# Patient Record
Sex: MALE | Race: WHITE | NOT HISPANIC OR LATINO | Employment: FULL TIME | URBAN - METROPOLITAN AREA
[De-identification: names, ages, dates, MRNs, and addresses within clinical notes are randomized per-mention and may not be internally consistent; named-entity substitution may affect disease eponyms.]

---

## 2021-01-06 ENCOUNTER — OFFICE VISIT (OUTPATIENT)
Dept: FAMILY MEDICINE CLINIC | Facility: CLINIC | Age: 36
End: 2021-01-06
Payer: COMMERCIAL

## 2021-01-06 VITALS
SYSTOLIC BLOOD PRESSURE: 112 MMHG | TEMPERATURE: 97.5 F | HEART RATE: 93 BPM | DIASTOLIC BLOOD PRESSURE: 60 MMHG | OXYGEN SATURATION: 99 % | WEIGHT: 302 LBS | BODY MASS INDEX: 48.53 KG/M2 | HEIGHT: 66 IN | RESPIRATION RATE: 16 BRPM

## 2021-01-06 DIAGNOSIS — Z23 NEED FOR TDAP VACCINATION: ICD-10-CM

## 2021-01-06 DIAGNOSIS — G47.19 EXCESSIVE DAYTIME SLEEPINESS: ICD-10-CM

## 2021-01-06 DIAGNOSIS — E83.41 HYPERMAGNESEMIA: ICD-10-CM

## 2021-01-06 DIAGNOSIS — E55.9 VITAMIN D DEFICIENCY: ICD-10-CM

## 2021-01-06 DIAGNOSIS — D50.8 IRON DEFICIENCY ANEMIA SECONDARY TO INADEQUATE DIETARY IRON INTAKE: Primary | ICD-10-CM

## 2021-01-06 PROCEDURE — 99204 OFFICE O/P NEW MOD 45 MIN: CPT | Performed by: FAMILY MEDICINE

## 2021-01-06 PROCEDURE — 90471 IMMUNIZATION ADMIN: CPT | Performed by: FAMILY MEDICINE

## 2021-01-06 PROCEDURE — 1036F TOBACCO NON-USER: CPT | Performed by: FAMILY MEDICINE

## 2021-01-06 PROCEDURE — 3008F BODY MASS INDEX DOCD: CPT | Performed by: FAMILY MEDICINE

## 2021-01-06 PROCEDURE — 3725F SCREEN DEPRESSION PERFORMED: CPT | Performed by: FAMILY MEDICINE

## 2021-01-06 PROCEDURE — 90715 TDAP VACCINE 7 YRS/> IM: CPT | Performed by: FAMILY MEDICINE

## 2021-01-06 RX ORDER — ERGOCALCIFEROL 1.25 MG/1
50000 CAPSULE ORAL WEEKLY
COMMUNITY
Start: 2021-01-12 | End: 2021-03-15 | Stop reason: SDUPTHER

## 2021-01-06 RX ORDER — FERROUS SULFATE 325(65) MG
325 TABLET ORAL 2 TIMES DAILY
COMMUNITY
Start: 2021-01-05 | End: 2022-02-11

## 2021-01-06 RX ORDER — PANTOPRAZOLE SODIUM 40 MG/1
40 TABLET, DELAYED RELEASE ORAL DAILY
COMMUNITY
Start: 2021-01-05 | End: 2022-01-05 | Stop reason: ALTCHOICE

## 2021-01-06 RX ORDER — DIPHENOXYLATE HYDROCHLORIDE AND ATROPINE SULFATE 2.5; .025 MG/1; MG/1
1 TABLET ORAL DAILY
COMMUNITY
Start: 2021-01-06 | End: 2022-02-11

## 2021-01-06 NOTE — PATIENT INSTRUCTIONS
Repeat cbc, magnesium on Monday  Repeat vit D in 8 weeks (early march)  F/u with hematology  Make appt with GI  Consider sleep study

## 2021-01-06 NOTE — PROGRESS NOTES
Assessment/Plan:    No problem-specific Assessment & Plan notes found for this encounter  Diagnoses and all orders for this visit:    Iron deficiency anemia secondary to inadequate dietary iron intake  Comments:  +heme positive stools and hx of peptic ulcers, but major share of pt's anemia is likely due to poor absorption after pennie en y   continue iron infusions with hematology  Make appt with GI  Recheck cbc on Monday to ensure stability    Vitamin D deficiency  Comments:  continue weekly high-dose supplementation  recheck 8 weeks  Orders:  -     Vitamin D 25 hydroxy; Future    Hypermagnesemia  Comments:  mildly high in hospital  recheck monday with cbc  Orders:  -     Magnesium; Future    Excessive daytime sleepiness  Comments:  refer for sleep study  Orders:  -     Diagnostic Sleep Study; Future    Need for Tdap vaccination  -     TDAP VACCINE GREATER THAN OR EQUAL TO 6YO IM    Other orders  -     pantoprazole (PROTONIX) 40 mg tablet; Take 40 mg by mouth daily  -     multivitamin (THERAGRAN) TABS; Take 1 tablet by mouth daily  -     ferrous sulfate 325 (65 Fe) mg tablet; Take 325 mg by mouth 2 (two) times a day  -     ergocalciferol (VITAMIN D2) 50,000 units; Take 50,000 Units by mouth once a week  -     cyanocobalamin (VITAMIN B-12) 1000 MCG tablet; Take 1,000 mcg by mouth daily  -     CALCIUM CARBONATE-VITAMIN D PO; Take 1 tablet by mouth 2 (two) times a day        Subjective:      Patient ID: Maryjane Funez is a 38 y o  male  HPI     Pt presents as new pt  D/c'd yesterday from [facility]- for symptomatic iron-deficiency anemia  Went in with shortness of breath, palps, chest pain, LE edema   hgb antoinette of 5 1  Pt had gastric bypass 15 years ago and has never had follow-up and hasn't taken any meds since  Stool heme+  Pt has noticed no blood in stool or dark tarry stools  Pt was transfused 2 units separately and hgb came up to 6 9    Pt was given IV iron infusion and he was d/c'd with hgb 6 9 with instructions to f/u with GI for scopes and heme-onc for continued iron infusion  Pt's vit D was 5, so he was started on high-dose replacement and is also being supplemented with vit B12 and oral iron  protonix started for gastric protection  Of note, pt states he has a hx of gastric ulcers, but he states that since transfusion, he has had no abd pain, shortness of breath, palps, chest pain, and edema has resolved  He feels well today  Pt's high weight was 450 and low was about 220  He is currently 350  Doesn't exercise  He states he is pretty sure he has sleep apnea  Doesn't sleep well and states he never has  Unsure if he snores, but he wakes up about q30min  +tired all the time  The following portions of the patient's history were reviewed and updated as appropriate: allergies, current medications, past family history, past medical history, past social history, past surgical history and problem list     Review of Systems   Constitutional: Positive for fatigue  Negative for chills, fever and unexpected weight change  HENT: Negative for congestion, ear pain, hearing loss, postnasal drip, rhinorrhea, sinus pressure, sinus pain, sore throat, trouble swallowing and voice change  Eyes: Negative for pain, redness and visual disturbance  Respiratory: Negative for cough and shortness of breath  Cardiovascular: Negative for chest pain, palpitations and leg swelling  Gastrointestinal: Negative for abdominal pain, constipation, diarrhea and nausea  Endocrine: Negative for cold intolerance, heat intolerance, polydipsia and polyuria  Genitourinary: Negative for dysuria, frequency and urgency  Musculoskeletal: Negative for arthralgias, joint swelling and myalgias  Skin: Negative for rash  No suspicious lesions   Neurological: Negative for weakness, numbness and headaches  Hematological: Negative for adenopathy           Objective:      /60   Pulse 93   Temp 97 5 °F (36 4 °C) Resp 16    5' 6 46" (1 688 m)   Wt (!) 137 kg (302 lb)   SpO2 99%   BMI 48 08 kg/m²          Physical Exam  Constitutional:       General: He is not in acute distress  Appearance: He is well-developed  He is obese  Comments: pallor   HENT:      Head: Normocephalic and atraumatic  Right Ear: Tympanic membrane, ear canal and external ear normal       Left Ear: Tympanic membrane, ear canal and external ear normal       Nose: Nose normal       Mouth/Throat:      Pharynx: No oropharyngeal exudate  Comments: Small, posterior airway  Large tongue  Eyes:      Conjunctiva/sclera: Conjunctivae normal       Pupils: Pupils are equal, round, and reactive to light  Neck:      Thyroid: No thyromegaly  Vascular: No carotid bruit or JVD  Cardiovascular:      Rate and Rhythm: Regular rhythm  Heart sounds: S1 normal and S2 normal  No murmur  No friction rub  No gallop  No S3 or S4 sounds  Pulmonary:      Effort: Pulmonary effort is normal       Breath sounds: Normal breath sounds  No wheezing, rhonchi or rales  Abdominal:      General: Bowel sounds are normal  There is no distension  Palpations: Abdomen is soft  Tenderness: There is no abdominal tenderness  Lymphadenopathy:      Cervical: No cervical adenopathy  Neurological:      Mental Status: He is alert and oriented to person, place, and time  Cranial Nerves: No cranial nerve deficit  Sensory: No sensory deficit  Deep Tendon Reflexes: Reflexes are normal and symmetric

## 2021-01-11 ENCOUNTER — TELEPHONE (OUTPATIENT)
Dept: FAMILY MEDICINE CLINIC | Facility: CLINIC | Age: 36
End: 2021-01-11

## 2021-01-11 ENCOUNTER — APPOINTMENT (OUTPATIENT)
Dept: LAB | Facility: CLINIC | Age: 36
End: 2021-01-11
Payer: COMMERCIAL

## 2021-01-11 DIAGNOSIS — E83.41 HYPERMAGNESEMIA: ICD-10-CM

## 2021-01-11 DIAGNOSIS — D50.8 IRON DEFICIENCY ANEMIA SECONDARY TO INADEQUATE DIETARY IRON INTAKE: Primary | ICD-10-CM

## 2021-01-11 DIAGNOSIS — D50.8 IRON DEFICIENCY ANEMIA SECONDARY TO INADEQUATE DIETARY IRON INTAKE: ICD-10-CM

## 2021-01-11 LAB
BASOPHILS # BLD AUTO: 0.14 THOUSANDS/ΜL (ref 0–0.1)
BASOPHILS NFR BLD AUTO: 3 % (ref 0–1)
EOSINOPHIL # BLD AUTO: 0.2 THOUSAND/ΜL (ref 0–0.61)
EOSINOPHIL NFR BLD AUTO: 4 % (ref 0–6)
ERYTHROCYTE [DISTWIDTH] IN BLOOD BY AUTOMATED COUNT: 28.4 % (ref 11.6–15.1)
HCT VFR BLD AUTO: 34 % (ref 36.5–49.3)
HGB BLD-MCNC: 8.9 G/DL (ref 12–17)
IMM GRANULOCYTES # BLD AUTO: 0.05 THOUSAND/UL (ref 0–0.2)
IMM GRANULOCYTES NFR BLD AUTO: 1 % (ref 0–2)
LYMPHOCYTES # BLD AUTO: 1.58 THOUSANDS/ΜL (ref 0.6–4.47)
LYMPHOCYTES NFR BLD AUTO: 29 % (ref 14–44)
MAGNESIUM SERPL-MCNC: 2 MG/DL (ref 1.6–2.6)
MCH RBC QN AUTO: 19.3 PG (ref 26.8–34.3)
MCHC RBC AUTO-ENTMCNC: 26.2 G/DL (ref 31.4–37.4)
MCV RBC AUTO: 74 FL (ref 82–98)
MONOCYTES # BLD AUTO: 0.38 THOUSAND/ΜL (ref 0.17–1.22)
MONOCYTES NFR BLD AUTO: 7 % (ref 4–12)
NEUTROPHILS # BLD AUTO: 3.15 THOUSANDS/ΜL (ref 1.85–7.62)
NEUTS SEG NFR BLD AUTO: 56 % (ref 43–75)
NRBC BLD AUTO-RTO: 0 /100 WBCS
PLATELET # BLD AUTO: 319 THOUSANDS/UL (ref 149–390)
PMV BLD AUTO: 9 FL (ref 8.9–12.7)
RBC # BLD AUTO: 4.62 MILLION/UL (ref 3.88–5.62)
WBC # BLD AUTO: 5.5 THOUSAND/UL (ref 4.31–10.16)

## 2021-01-11 PROCEDURE — 85025 COMPLETE CBC W/AUTO DIFF WBC: CPT

## 2021-01-11 PROCEDURE — 36415 COLL VENOUS BLD VENIPUNCTURE: CPT

## 2021-01-11 PROCEDURE — 83735 ASSAY OF MAGNESIUM: CPT

## 2021-01-11 NOTE — TELEPHONE ENCOUNTER
Patient called he wants to go for his lab work this morning but Dr Reece Alcantara did not put the CBC in the order for him to have done  Would you please have someone put the order in for the CBC so he can get these labs drawn this morning, today is the only day he has to go get it drawn  Please call patient when the order is in so he can head over

## 2021-01-12 ENCOUNTER — TELEPHONE (OUTPATIENT)
Dept: FAMILY MEDICINE CLINIC | Facility: CLINIC | Age: 36
End: 2021-01-12

## 2021-01-12 NOTE — TELEPHONE ENCOUNTER
Patient returned to work yesterday, Monday, 1/11/21 and his employer would like a note stating that it is ok for him to return  Would you please be so kind as to provide him with a note stating that it is ok for the patient to return to work full duty on Monday, 1/11/21  Please enter note on portal and advise patient when ready

## 2021-01-27 ENCOUNTER — PATIENT MESSAGE (OUTPATIENT)
Dept: FAMILY MEDICINE CLINIC | Facility: CLINIC | Age: 36
End: 2021-01-27

## 2021-01-28 NOTE — TELEPHONE ENCOUNTER
From: Lacy Garcia LPN  To: Nikkokristina Singh  Sent: 1/27/2021 11:56 AM EST  Subject: Influenza Vaccine     Hello,     We hope you are doing well  While it is important to receive your flu vaccine yearly, our records show that you have not yet received yours for this year  Please call the office at [phone] if you are interested in scheduling a visit to receive the vaccine  If you have had your flu vaccine outside of our office, please reply to this message with the date and location so we can update your medical record  If you have already received your flu vaccine in our office, please disregard this message            We look forward to hearing from you,    Your Medical Team at [phone]

## 2021-03-10 DIAGNOSIS — Z23 ENCOUNTER FOR IMMUNIZATION: ICD-10-CM

## 2021-03-12 ENCOUNTER — TELEPHONE (OUTPATIENT)
Dept: FAMILY MEDICINE CLINIC | Facility: CLINIC | Age: 36
End: 2021-03-12

## 2021-03-12 DIAGNOSIS — E55.9 VITAMIN D DEFICIENCY: Primary | ICD-10-CM

## 2021-03-12 NOTE — TELEPHONE ENCOUNTER
Patient called in, he could not reply on mychart    he would like his vitamin d med to go the [facility]  He said dr Shemar Cardenas sent a message via [employer]-    Mr Hugo Dietrich,     Your vitamin D is much higher, though still not at goal   I want to do another 8 weeks of vitamin d supplementation (once a week, 50,000 IU) and recheck  Glee Sever is up now   Discontinue any multivitamins  Please let me know what pharmacy I can sent the vitamin d to       Dr Raffaele Mederos

## 2021-03-15 ENCOUNTER — IMMUNIZATIONS (OUTPATIENT)
Dept: FAMILY MEDICINE CLINIC | Facility: HOSPITAL | Age: 36
End: 2021-03-15

## 2021-03-15 DIAGNOSIS — Z23 ENCOUNTER FOR IMMUNIZATION: Primary | ICD-10-CM

## 2021-03-15 PROCEDURE — 91300 SARS-COV-2 / COVID-19 MRNA VACCINE (PFIZER-BIONTECH) 30 MCG: CPT

## 2021-03-15 PROCEDURE — 0001A SARS-COV-2 / COVID-19 MRNA VACCINE (PFIZER-BIONTECH) 30 MCG: CPT

## 2021-03-15 RX ORDER — ERGOCALCIFEROL 1.25 MG/1
50000 CAPSULE ORAL WEEKLY
Qty: 8 CAPSULE | Refills: 0 | Status: SHIPPED | OUTPATIENT
Start: 2021-03-15 | End: 2022-02-11 | Stop reason: ALTCHOICE

## 2021-03-15 NOTE — TELEPHONE ENCOUNTER
Medication: Vitamin D2 50,000  Last refilled: 1/6/21  Last Office Visit: 1/6/21  Next Office Visit: 1/7/22  Pharmacy: [facility]

## 2021-03-15 NOTE — TELEPHONE ENCOUNTER
Patient called in, he said he went to the pharmacy but they did not have any meds for him to   Please send the vitamin d to [facility] pharmacy and call patient back?

## 2021-03-23 ENCOUNTER — TELEMEDICINE (OUTPATIENT)
Dept: FAMILY MEDICINE CLINIC | Facility: CLINIC | Age: 36
End: 2021-03-23
Payer: COMMERCIAL

## 2021-03-23 VITALS — WEIGHT: 300 LBS | BODY MASS INDEX: 48.21 KG/M2 | HEIGHT: 66 IN

## 2021-03-23 DIAGNOSIS — E55.9 VITAMIN D DEFICIENCY: ICD-10-CM

## 2021-03-23 DIAGNOSIS — F41.1 GAD (GENERALIZED ANXIETY DISORDER): Primary | ICD-10-CM

## 2021-03-23 PROCEDURE — 3008F BODY MASS INDEX DOCD: CPT | Performed by: FAMILY MEDICINE

## 2021-03-23 PROCEDURE — 99213 OFFICE O/P EST LOW 20 MIN: CPT | Performed by: FAMILY MEDICINE

## 2021-03-23 PROCEDURE — 1036F TOBACCO NON-USER: CPT | Performed by: FAMILY MEDICINE

## 2021-03-23 RX ORDER — CHOLECALCIFEROL (VITAMIN D3) 1250 MCG
1 CAPSULE ORAL WEEKLY
Qty: 4 CAPSULE | Refills: 0 | Status: SHIPPED | OUTPATIENT
Start: 2021-03-23 | End: 2022-02-11 | Stop reason: ALTCHOICE

## 2021-03-23 RX ORDER — SERTRALINE HYDROCHLORIDE 25 MG/1
TABLET, FILM COATED ORAL
Qty: 60 TABLET | Refills: 1 | Status: SHIPPED | OUTPATIENT
Start: 2021-03-23 | End: 2021-04-06

## 2021-03-24 NOTE — PROGRESS NOTES
Virtual Regular Visit      Assessment/Plan:    Problem List Items Addressed This Visit        Other    SHELLY (generalized anxiety disorder) - Primary  Discussed tx options  Start zoloft 25mg tabs: 1/2 tab daily x 1 week, then 1 tab daily x 1 week, then 1 1/2 tabs daily x 1 week, then 2 tabs daily  Recheck 6 weeks      Relevant Medications    sertraline (ZOLOFT) 25 mg tablet    Vitamin D deficiency  Recheck vit D 8 weeks      Relevant Medications    Cholecalciferol (Vitamin D3) 1 25 MG (09955 UT) CAPS               Reason for visit is   Chief Complaint   Patient presents with    Virtual Brief Visit    Anxiety    Virtual Regular Visit        Encounter provider Carol Denney DO    Provider located at [address] [place] [address]  [address] [facility] [address] [phone]      Recent Visits  No visits were found meeting these conditions  Showing recent visits within past 7 days and meeting all other requirements     Today's Visits  Date Type Provider Dept   03/23/21 Telemedicine Carol Denney DO [facility]   Showing today's visits and meeting all other requirements     Future Appointments  No visits were found meeting these conditions  Showing future appointments within next 150 days and meeting all other requirements        The patient was identified by name and date of birth  Kelsea Olson was informed that this is a telemedicine visit and that the visit is being conducted through [facility] and patient was informed that this is a secure, HIPAA-compliant platform  He agrees to proceed     My office door was closed  No one else was in the room  He acknowledged consent and understanding of privacy and security of the video platform  The patient has agreed to participate and understands they can discontinue the visit at any time  Patient is aware this is a billable service       Subjective  Kelsea Olson is a 38 y o  male who presents c/o months of worsening anxiety, overworry, feeling overwhelmed, panic, decreased concentration  Has had increased stressors with health, work  Denies sadness, hopelessness, si/hi, hx of impulsivity  +family hx of depression/anxiety in mom/dad  HPI     Past Medical History:   Diagnosis Date    Anemia     Morbid obesity ([address] )     Peptic ulcer        Past Surgical History:   Procedure Laterality Date    GASTRIC BYPASS      LA NENA-EN-Y PROCEDURE      TONSILECTOMY AND ADNOIDECTOMY         Current Outpatient Medications   Medication Sig Dispense Refill    CALCIUM CARBONATE-VITAMIN D PO Take 1 tablet by mouth 2 (two) times a day      cyanocobalamin (VITAMIN B-12) 1000 MCG tablet Take 1,000 mcg by mouth daily      ergocalciferol (VITAMIN D2) 50,000 units Take 1 capsule (50,000 Units total) by mouth once a week 8 capsule 0    ferrous sulfate 325 (65 Fe) mg tablet Take 325 mg by mouth 2 (two) times a day      multivitamin (THERAGRAN) TABS Take 1 tablet by mouth daily      pantoprazole (PROTONIX) 40 mg tablet Take 40 mg by mouth daily      Cholecalciferol (Vitamin D3) 1 25 MG (74714 UT) CAPS Take 1 capsule (50,000 Units total) by mouth once a week 4 capsule 0    sertraline (ZOLOFT) 25 mg tablet Start zoloft 25mg tabs: 1/2 tab daily x 1 week, then 1 tab daily x 1 week, then 1 1/2 tabs daily x 1 week, then 2 tabs daily 60 tablet 1     No current facility-administered medications for this visit  No Known Allergies    Review of Systems  See hpi    Video Exam    Vitals:    03/23/21 1324   Weight: 136 kg (300 lb)   Height: 5' 6" (1 676 m)       Physical Exam  Constitutional:       Appearance: Normal appearance  Eyes:      Extraocular Movements: Extraocular movements intact  Conjunctiva/sclera: Conjunctivae normal    Pulmonary:      Effort: Pulmonary effort is normal  No respiratory distress  Neurological:      Mental Status: He is alert and oriented to person, place, and time        Cranial Nerves: No cranial nerve deficit  Psychiatric:         Attention and Perception: Attention normal          Mood and Affect: Mood is anxious  Speech: Speech is rapid and pressured  Behavior: Behavior normal          Thought Content: Thought content normal          Cognition and Memory: Cognition normal          Judgment: Judgment normal           I spent 15 minutes directly with the patient during this visit      VIRTUAL VISIT DISCLAIMER    Maryjane Armin acknowledges that he has consented to an online visit or consultation  He understands that the online visit is based solely on information provided by him, and that, in the absence of a face-to-face physical evaluation by the physician, the diagnosis he receives is both limited and provisional in terms of accuracy and completeness  This is not intended to replace a full medical face-to-face evaluation by the physician  Maryjane Funez understands and accepts these terms

## 2021-04-05 ENCOUNTER — TELEPHONE (OUTPATIENT)
Dept: FAMILY MEDICINE CLINIC | Facility: CLINIC | Age: 36
End: 2021-04-05

## 2021-04-05 ENCOUNTER — IMMUNIZATIONS (OUTPATIENT)
Dept: FAMILY MEDICINE CLINIC | Facility: HOSPITAL | Age: 36
End: 2021-04-05

## 2021-04-05 DIAGNOSIS — Z23 ENCOUNTER FOR IMMUNIZATION: Primary | ICD-10-CM

## 2021-04-05 PROCEDURE — 0002A SARS-COV-2 / COVID-19 MRNA VACCINE (PFIZER-BIONTECH) 30 MCG: CPT | Performed by: NURSE PRACTITIONER

## 2021-04-05 PROCEDURE — 91300 SARS-COV-2 / COVID-19 MRNA VACCINE (PFIZER-BIONTECH) 30 MCG: CPT | Performed by: NURSE PRACTITIONER

## 2021-04-05 NOTE — TELEPHONE ENCOUNTER
Returned patient call, lightheadedness last an hour or two, he no longer feel lightheaded, no weakness, he thinks the medications isn't working for him - last time he took medication was around 7 am  No SOB  Please advise

## 2021-04-05 NOTE — TELEPHONE ENCOUNTER
Pt called and said the medication (citriline) isnt working  He took 3 pills instead of 1 yesterday and 4 today and now feels lightheaded   Wants to know if he should go up in dosage or maybe try another medication

## 2021-04-05 NOTE — TELEPHONE ENCOUNTER
Patient needs to follow Zoloft advise as given by Dr Vinnie Kim - please refer to Rx sig for direction  He will not feel well if changes his dose frequently  This medication usually takes 6-8 weeks to work, but he may notice a difference in 2 weeks

## 2021-04-05 NOTE — TELEPHONE ENCOUNTER
Placed call to patient to gather more info regarding medication, he will call back when he has time

## 2021-04-05 NOTE — TELEPHONE ENCOUNTER
Patient called back  Said he missed a call but not sure from who or regarding what  I am assuming this is in regard to the medication issue notes from this morning   Requests a call back

## 2021-04-06 ENCOUNTER — TELEMEDICINE (OUTPATIENT)
Dept: FAMILY MEDICINE CLINIC | Facility: CLINIC | Age: 36
End: 2021-04-06
Payer: COMMERCIAL

## 2021-04-06 VITALS — WEIGHT: 300 LBS | HEIGHT: 66 IN | BODY MASS INDEX: 48.21 KG/M2

## 2021-04-06 DIAGNOSIS — F41.1 GAD (GENERALIZED ANXIETY DISORDER): Primary | ICD-10-CM

## 2021-04-06 PROCEDURE — 1036F TOBACCO NON-USER: CPT | Performed by: FAMILY MEDICINE

## 2021-04-06 PROCEDURE — 3008F BODY MASS INDEX DOCD: CPT | Performed by: FAMILY MEDICINE

## 2021-04-06 PROCEDURE — 99214 OFFICE O/P EST MOD 30 MIN: CPT | Performed by: FAMILY MEDICINE

## 2021-04-06 RX ORDER — FLUOXETINE 20 MG/1
20 TABLET, FILM COATED ORAL DAILY
Qty: 30 TABLET | Refills: 1 | Status: SHIPPED | OUTPATIENT
Start: 2021-04-06 | End: 2021-04-21

## 2021-04-06 RX ORDER — QUETIAPINE FUMARATE 25 MG/1
25 TABLET, FILM COATED ORAL
Qty: 30 TABLET | Refills: 1 | Status: SHIPPED | OUTPATIENT
Start: 2021-04-06 | End: 2021-04-21

## 2021-04-06 NOTE — PROGRESS NOTES
Virtual Visit -conducted by sahra  My door was closed  No one else was in the room  Shelby David DO 04/06/21        Assessment/Plan:    No problem-specific Assessment & Plan notes found for this encounter  Diagnoses and all orders for this visit:    SHELLY (generalized anxiety disorder)  Comments:  worsening--unclear whether secondary to meds vs anxiety itself  d/c zoloft  start prozac   add seroquel 25mg qHS for sleep and anxiety reduction  check in with me in a week re: how he is feeling   Discussed medical leave from work for a month while meds get sorted if he feels he cannot work  He will let me know  Orders:  -     FLUoxetine (PROzac) 20 MG tablet; Take 1 tablet (20 mg total) by mouth daily  -     QUEtiapine (SEROquel) 25 mg tablet; Take 1 tablet (25 mg total) by mouth daily at bedtime    Other orders  -     Cancel: HIV 1/2 Antigen/Antibody (4th Generation) w Reflex [facility]; Future        Subjective:      Patient ID: Dianne Morley is a 38 y o  male  HPI  Pt presents c/o worsening anxiety  Started zoloft about 4 weeks ago and was uptitrating  Over the past week, his anxiety worsened significantly  Feels like he is having  "a nervous breakdown "  Feels overwhelmed and jittery all the time  Can't sleep due to worry/fear  Feels palps  +panic  Denies sadness, hopelessness, depression, si/hi  Can't focus on anything--affecting work  Went to ED at [facility] yesterday  Note unavailable  The following portions of the patient's history were reviewed and updated as appropriate: allergies, current medications, past family history, past medical history, past social history, past surgical history and problem list     Review of Systems   Constitutional: Negative for chills, fatigue, fever and unexpected weight change  HENT: Negative for congestion, ear pain, hearing loss, postnasal drip, rhinorrhea, sinus pressure, sinus pain, sore throat, trouble swallowing and voice change      Eyes: Negative for pain, redness and visual disturbance  Respiratory: Negative for cough and shortness of breath  Cardiovascular: Negative for chest pain, palpitations and leg swelling  Gastrointestinal: Negative for abdominal pain, constipation, diarrhea and nausea  Endocrine: Negative for cold intolerance, heat intolerance, polydipsia and polyuria  Genitourinary: Negative for dysuria, frequency and urgency  Musculoskeletal: Negative for arthralgias, joint swelling and myalgias  Skin: Negative for rash  No suspicious lesions   Neurological: Negative for weakness, numbness and headaches  Hematological: Negative for adenopathy  Psychiatric/Behavioral: Positive for agitation, decreased concentration and sleep disturbance  Negative for dysphoric mood  The patient is nervous/anxious  The patient is not hyperactive  Objective:      Ht 5' 6" (1 676 m)   Wt 136 kg (300 lb)   BMI 48 42 kg/m²          Physical Exam  Constitutional:       Appearance: Normal appearance  Eyes:      Extraocular Movements: Extraocular movements intact  Conjunctiva/sclera: Conjunctivae normal    Pulmonary:      Effort: Pulmonary effort is normal  No respiratory distress  Neurological:      Mental Status: He is alert and oriented to person, place, and time  Cranial Nerves: No cranial nerve deficit  Psychiatric:         Attention and Perception: Attention normal          Mood and Affect: Mood is anxious  Speech: Speech is rapid and pressured  Behavior: Behavior is agitated  Behavior is cooperative  Thought Content:  Thought content normal          Cognition and Memory: Cognition normal          Judgment: Judgment normal

## 2021-04-08 ENCOUNTER — TELEPHONE (OUTPATIENT)
Dept: FAMILY MEDICINE CLINIC | Facility: HOSPITAL | Age: 36
End: 2021-04-08

## 2021-04-08 NOTE — TELEPHONE ENCOUNTER
Reviewed mycahrt message 4/6/21 - list of counselors were not included, will route to Dr Esmer Goldstein

## 2021-04-08 NOTE — TELEPHONE ENCOUNTER
Patient has been speaking with Dr Jose Jimenez through portal messages but he did not receive the list of counselor's she is recommending, would you please resend the list so he can start the process of getting scheduled

## 2021-04-09 ENCOUNTER — TELEPHONE (OUTPATIENT)
Dept: FAMILY MEDICINE CLINIC | Facility: CLINIC | Age: 36
End: 2021-04-09

## 2021-04-09 NOTE — TELEPHONE ENCOUNTER
Placed call to patient regarding note, it would need to state how long he needs to stay out of work for, when he can return to work and what capacities   The starting date would be 4/7/21

## 2021-04-09 NOTE — TELEPHONE ENCOUNTER
Patient called, he needs a note to excuse him from work so that they can start the process for his FMLA, he would like to know if you would be able to get the letter done today so that he could get it to his employer to start the process for him?   Please advise

## 2021-04-21 DIAGNOSIS — F41.1 GAD (GENERALIZED ANXIETY DISORDER): Primary | ICD-10-CM

## 2021-04-21 RX ORDER — QUETIAPINE FUMARATE 50 MG/1
50 TABLET, FILM COATED ORAL
Qty: 30 TABLET | Refills: 3 | Status: SHIPPED | OUTPATIENT
Start: 2021-04-21 | End: 2022-02-11 | Stop reason: ALTCHOICE

## 2021-04-21 RX ORDER — FLUOXETINE HYDROCHLORIDE 40 MG/1
40 CAPSULE ORAL DAILY
Qty: 30 CAPSULE | Refills: 3 | Status: SHIPPED | OUTPATIENT
Start: 2021-04-21 | End: 2021-05-04

## 2021-04-26 ENCOUNTER — TELEPHONE (OUTPATIENT)
Dept: FAMILY MEDICINE CLINIC | Facility: CLINIC | Age: 36
End: 2021-04-26

## 2021-04-28 ENCOUNTER — RA CDI HCC (OUTPATIENT)
Dept: OTHER | Facility: HOSPITAL | Age: 36
End: 2021-04-28

## 2021-04-28 NOTE — PROGRESS NOTES
[place] [id number]  coding opportunities             Chart reviewed, (number of) suggestions sent to provider: 2     Problem listed updated   Provider Accepted, (number of) suggestions accepted: 0        Patients insurance company: [employer] (Medicare Advantage and Commercial)     Visit status: Patient arrived for their scheduled appointment     Provider never responded to [place] [address]  coding request     [place] [address]  coding opportunities             Chart reviewed, (number of) suggestions sent to provider: 2           Patients insurance company: [employer] (Medicare Advantage and Commercial)             Suggested DX-- based on BMI     DX: E66 01 Morbid (severe) obesity due to excess calories ([place] [address] )      DX: Z68 40 - Z68 -- Body mass index (BMI),adult  (Pick one from the Z68 40 - O86 --)

## 2021-04-29 DIAGNOSIS — F41.1 GAD (GENERALIZED ANXIETY DISORDER): Primary | ICD-10-CM

## 2021-04-29 RX ORDER — CLONAZEPAM 0.5 MG/1
0.5 TABLET ORAL 2 TIMES DAILY
Qty: 60 TABLET | Refills: 0 | Status: SHIPPED | OUTPATIENT
Start: 2021-04-29 | End: 2022-02-11 | Stop reason: ALTCHOICE

## 2021-05-04 ENCOUNTER — TRANSCRIBE ORDERS (OUTPATIENT)
Dept: LAB | Facility: CLINIC | Age: 36
End: 2021-05-04

## 2021-05-04 ENCOUNTER — APPOINTMENT (OUTPATIENT)
Dept: LAB | Facility: CLINIC | Age: 36
End: 2021-05-04
Payer: COMMERCIAL

## 2021-05-04 ENCOUNTER — TELEPHONE (OUTPATIENT)
Dept: FAMILY MEDICINE CLINIC | Facility: CLINIC | Age: 36
End: 2021-05-04

## 2021-05-04 ENCOUNTER — OFFICE VISIT (OUTPATIENT)
Dept: FAMILY MEDICINE CLINIC | Facility: CLINIC | Age: 36
End: 2021-05-04
Payer: COMMERCIAL

## 2021-05-04 VITALS
BODY MASS INDEX: 45.8 KG/M2 | SYSTOLIC BLOOD PRESSURE: 120 MMHG | OXYGEN SATURATION: 98 % | RESPIRATION RATE: 16 BRPM | WEIGHT: 285 LBS | DIASTOLIC BLOOD PRESSURE: 90 MMHG | HEIGHT: 66 IN | HEART RATE: 79 BPM | TEMPERATURE: 96.7 F

## 2021-05-04 DIAGNOSIS — Z98.84 BARIATRIC SURGERY STATUS: ICD-10-CM

## 2021-05-04 DIAGNOSIS — F41.1 GAD (GENERALIZED ANXIETY DISORDER): Primary | ICD-10-CM

## 2021-05-04 DIAGNOSIS — E55.9 VITAMIN D DEFICIENCY: ICD-10-CM

## 2021-05-04 DIAGNOSIS — F41.1 GAD (GENERALIZED ANXIETY DISORDER): ICD-10-CM

## 2021-05-04 DIAGNOSIS — Z98.84 BARIATRIC SURGERY STATUS: Primary | ICD-10-CM

## 2021-05-04 DIAGNOSIS — Z11.4 SCREENING FOR HIV (HUMAN IMMUNODEFICIENCY VIRUS): ICD-10-CM

## 2021-05-04 LAB
25(OH)D3 SERPL-MCNC: 36.4 NG/ML (ref 30–100)
TSH SERPL DL<=0.05 MIU/L-ACNC: 3.71 UIU/ML (ref 0.36–3.74)

## 2021-05-04 PROCEDURE — 84591 ASSAY OF NOS VITAMIN: CPT

## 2021-05-04 PROCEDURE — 84207 ASSAY OF VITAMIN B-6: CPT

## 2021-05-04 PROCEDURE — 84590 ASSAY OF VITAMIN A: CPT

## 2021-05-04 PROCEDURE — 36415 COLL VENOUS BLD VENIPUNCTURE: CPT

## 2021-05-04 PROCEDURE — 84425 ASSAY OF VITAMIN B-1: CPT

## 2021-05-04 PROCEDURE — 99213 OFFICE O/P EST LOW 20 MIN: CPT | Performed by: FAMILY MEDICINE

## 2021-05-04 PROCEDURE — 84443 ASSAY THYROID STIM HORMONE: CPT

## 2021-05-04 PROCEDURE — 82306 VITAMIN D 25 HYDROXY: CPT

## 2021-05-04 PROCEDURE — 84630 ASSAY OF ZINC: CPT

## 2021-05-04 PROCEDURE — 1036F TOBACCO NON-USER: CPT | Performed by: FAMILY MEDICINE

## 2021-05-04 PROCEDURE — 3008F BODY MASS INDEX DOCD: CPT | Performed by: FAMILY MEDICINE

## 2021-05-04 RX ORDER — HYDROCORTISONE ACETATE 25 MG
SUPPOSITORY, RECTAL RECTAL
COMMUNITY
Start: 2021-04-14 | End: 2022-02-11 | Stop reason: ALTCHOICE

## 2021-05-04 RX ORDER — ESCITALOPRAM OXALATE 20 MG/1
20 TABLET ORAL DAILY
Qty: 30 TABLET | Refills: 1 | Status: SHIPPED | OUTPATIENT
Start: 2021-05-04 | End: 2021-05-25

## 2021-05-04 NOTE — TELEPHONE ENCOUNTER
Patient is scheduled for gene sight testing on Friday as per discussed with Dr Lebron Harris at his visit today, he spoke with his insurance company and they told him it was a covered service but he needs the codes for verification    Please call patient with this information so he can call him insurance company back prior to his appointment

## 2021-05-04 NOTE — PROGRESS NOTES
Assessment/Plan:    No problem-specific Assessment & Plan notes found for this encounter  Diagnoses and all orders for this visit:    SHELLY (generalized anxiety disorder)  Comments:  mild improvement if any with prozac  d/c same and start lexapro 20  genesight testing to see if that can help find optimal med  may return to work, but may need to reinstate leave if he isn't able to maintain focus  Orders:  -     escitalopram (LEXAPRO) 20 mg tablet; Take 1 tablet (20 mg total) by mouth daily    Screening for HIV (human immunodeficiency virus)  -     HIV 1/2 Antigen/Antibody (4th Generation) w Reflex [facility]; Future    Other orders  -     Anucort-HC 25 MG suppository        Subjective:      Patient ID: Samreen Trent is a 38 y o  male  HPI  Pt presents in f/u for his anxiety  Didn't do well on zoloft  After 1 mo on prozac, he feels a little bit better in terms of jitteriness/anxiety/worry but he isn't sure whether that is because he hasn't been at work or because the meds are working  He denies sadness, hopelessness, low energy or motivation  Has failed seroquel for sleep and anxiety Still feels shaky  Klonopin hasn't been helpful at all--doesn't make him calm or sleepy  He has hx of pennie-en-y and poor absorption  No si/hi  At this point, he wants to return to work, but he is not sure what will happen when he does return in terms of anxiety or concentration  TSH and vitamin D nml on most recent labs    The following portions of the patient's history were reviewed and updated as appropriate: allergies, current medications, past family history, past medical history, past social history, past surgical history and problem list     Review of Systems    See hpi    Objective:      /90   Pulse 79   Temp (!) 96 7 °F (35 9 °C)   Resp 16   Ht 5' 6" (1 676 m)   Wt 129 kg (285 lb)   SpO2 98%   BMI 46 00 kg/m²          Physical Exam  Constitutional:       Appearance: Normal appearance     HENT: Head: Normocephalic and atraumatic  Eyes:      Extraocular Movements: Extraocular movements intact  Conjunctiva/sclera: Conjunctivae normal    Neck:      Musculoskeletal: Normal range of motion  Cardiovascular:      Rate and Rhythm: Normal rate  Heart sounds: No murmur  No friction rub  No gallop  Pulmonary:      Effort: Pulmonary effort is normal       Breath sounds: No wheezing, rhonchi or rales  Lymphadenopathy:      Cervical: No cervical adenopathy  Neurological:      Mental Status: He is alert and oriented to person, place, and time  Cranial Nerves: No cranial nerve deficit  Sensory: No sensory deficit  Psychiatric:         Attention and Perception: Attention normal          Mood and Affect: Mood normal  Affect is not inappropriate  Speech: Speech is rapid and pressured  Behavior: Behavior is agitated  Behavior is cooperative  Thought Content:  Thought content normal          Cognition and Memory: Cognition normal          Judgment: Judgment normal

## 2021-05-04 NOTE — PATIENT INSTRUCTIONS
Stop prozac   Start lexapro  Find out about gene site testing  lmk what you feel like you need for work

## 2021-05-07 ENCOUNTER — CLINICAL SUPPORT (OUTPATIENT)
Dept: FAMILY MEDICINE CLINIC | Facility: CLINIC | Age: 36
End: 2021-05-07

## 2021-05-07 DIAGNOSIS — F41.9 ANXIETY: Primary | ICD-10-CM

## 2021-05-07 LAB — ZINC SERPL-MCNC: 83 UG/DL (ref 44–115)

## 2021-05-09 LAB — VIT A SERPL-MCNC: 38.4 UG/DL (ref 18.9–57.3)

## 2021-05-10 LAB — VIT B6 SERPL-MCNC: 14.8 UG/L (ref 5.3–46.7)

## 2021-05-11 LAB — VIT B1 BLD-SCNC: 231.7 NMOL/L (ref 66.5–200)

## 2021-05-13 LAB
NIACIN SERPL-MCNC: <5 NG/ML (ref 0–5)
NICOTINAMIDE SERPL-MCNC: 18.4 NG/ML (ref 5.2–72.1)

## 2021-05-18 ENCOUNTER — TELEPHONE (OUTPATIENT)
Dept: FAMILY MEDICINE CLINIC | Facility: CLINIC | Age: 36
End: 2021-05-18

## 2021-05-18 DIAGNOSIS — F41.1 GAD (GENERALIZED ANXIETY DISORDER): ICD-10-CM

## 2021-05-18 DIAGNOSIS — F32.1 CURRENT MODERATE EPISODE OF MAJOR DEPRESSIVE DISORDER WITHOUT PRIOR EPISODE (HCC): Primary | ICD-10-CM

## 2021-05-19 NOTE — TELEPHONE ENCOUNTER
Spoke with pt to review genesight testing  He has failed prozac, zoloft, klonopin, and seroquel, all of which he should respond to  Pt has had several months of worsening anxiety/panic attacks and now depression/hopelessness  Has occasional thoughts of self-harm  texted his mom that he hoped he wouldn't wake up the next morning over the weekend  She called him multiple times the next AM to check on him, he didn't feel like picking up the phone, and as she was away, she called the police for a safety check  Spending a lot of weekends with parents and mom expressed to me that she is afraid to leave him alone  He states he has no true plan  Symptoms worsening  I had him off work for a month for counseling (which he continues) and med adjustment after the onset of anxiety and panic attacks, and while being away from the stressors of work was mildly helpful, he isn't improving  States he is now uncovering depression and anxiety he has had for years and just pushed down  He hasn't responded to any medications--states he feels like they do nothing  Of note, pt has hx of gastric bypass and recent hospitalization for symptomatic anemia with hgb of 5 due to poor absorption  At this point, nothing has made any difference, and I am concerned that he isn't absorbing the medications  Needs psych consult for med changes/mgmt  Tawny Arnold and Kathrin Coronel, can you please help? He doesn't need inpatient hospitalization at this time, but I don't believe he can wait for outpt psych appt without assistance  Partial would be difficult as he fears he would lose his job, but he would consider this if he needed to

## 2021-05-20 ENCOUNTER — TELEPHONE (OUTPATIENT)
Dept: PSYCHOLOGY | Facility: CLINIC | Age: 36
End: 2021-05-20

## 2021-05-21 ENCOUNTER — TELEPHONE (OUTPATIENT)
Dept: FAMILY MEDICINE CLINIC | Facility: CLINIC | Age: 36
End: 2021-05-21

## 2021-05-21 NOTE — TELEPHONE ENCOUNTER
Received results from [facility]  See scanned document today 05/21/21  Will route to provider for review

## 2021-05-24 ENCOUNTER — PATIENT MESSAGE (OUTPATIENT)
Dept: FAMILY MEDICINE CLINIC | Facility: CLINIC | Age: 36
End: 2021-05-24

## 2021-05-24 DIAGNOSIS — F32.1 CURRENT MODERATE EPISODE OF MAJOR DEPRESSIVE DISORDER WITHOUT PRIOR EPISODE (HCC): Primary | ICD-10-CM

## 2021-05-24 NOTE — TELEPHONE ENCOUNTER
Patient called, he is concerned about getting an appointment with psychiatry and not hearing anything back, please call patient with an update on this

## 2021-05-24 NOTE — TELEPHONE ENCOUNTER
As we discussed, this can take months  Partial program may be all that is open presently, and he has refused that  I have asked for input from the psych team, and they are looking into it, but I have no information at present and can't make psychiatry see him outpt  If his mood worsens or he feels like he is in danger of hurting himself, he needs to present to the ER

## 2021-05-25 RX ORDER — DESVENLAFAXINE 25 MG/1
TABLET, EXTENDED RELEASE ORAL
Qty: 46 TABLET | Refills: 0 | Status: SHIPPED | OUTPATIENT
Start: 2021-05-25 | End: 2021-06-21 | Stop reason: SDUPTHER

## 2021-05-26 NOTE — TELEPHONE ENCOUNTER
Justina Maldonado,   I will reach out to patient, however our next available is 4-5 months out       Roseanna Hanks

## 2021-05-26 NOTE — TELEPHONE ENCOUNTER
Behavorial Health Outpatient Intake Questions    Referred by:PCP    Please advised interviewee that they need to answer all questions truthfully to allow for best care and any misrepresentations of information may affect their ability to be seen at this clinic   => Was this discussed? Yes     [facility]     Presenting Problem (in patient's words): Anxiety PCP feels that because pt had gastro sx his body isn't absorbing medication properly  Feels that different medications would be more effective however unable to prescribed by her  Are there any developmental disabilities? ? If yes, can they speak to you on the phone? If they are too limited to speak to you on phone, refer out No    Are you taking any psychiatric medications? Yes    => If yes, who prescribes? If yes, are they injectable medications? Prestique      Does the patient have a language barrier or hearing impairment? No    Have you been treated at [facility] by a therapist or a doctor in the past? If yes, who? No    Has the patient been hospitalized for mental health? No   If yes, how long ago was last hospitalization and where was it? Do you actively use alcohol or marijuana or illegal substances? If yes, what and how much - refer out to Drug and alcohol treatment if use is excessive or daily use of illegal substances No concerns of substance abuse are reported  Do you have a community treatment team or ? No    Legal History-     Does the patient have any history of arrests, detention/MCC time, or DUIs? No  If Yes-  1) What types of charges? 2) When were they last incarcerated? 3) Are they currently on parole or probation? Minor Child-    Who has custody of the child? Is there a custody agreement? If there is a custody agreement remind parent that they must bring a copy to the first appt or they will not be seen       Intake Team, please check with provider before scheduling if flags come up such as:  - complex case  - legal history (other than DUI)  - communication barrier concerns are present  - if, in your judgment, this needs further review    ACCEPTED as a patient Yes  => Appointment Date: Tuesday September 14,2021 at 9:00am with Dr Pimentel    Referred Elsewhere? No    Name of Insurance Co: [employer] ID# [id number]  [id number] Phone #  If ins is primary or secondary Primary  If patient is a minor, parents information such as Name, D  O B of guarantor

## 2021-05-26 NOTE — TELEPHONE ENCOUNTER
That's fine  We will then have to try and manage him via integration until seen  He can't attend partial unfortunately

## 2021-05-27 LAB — MISCELLANEOUS LAB TEST RESULT: NORMAL

## 2021-06-21 DIAGNOSIS — F32.1 CURRENT MODERATE EPISODE OF MAJOR DEPRESSIVE DISORDER WITHOUT PRIOR EPISODE (HCC): ICD-10-CM

## 2021-06-21 RX ORDER — DESVENLAFAXINE 25 MG/1
TABLET, EXTENDED RELEASE ORAL
Qty: 60 TABLET | Refills: 0 | Status: SHIPPED | OUTPATIENT
Start: 2021-06-21 | End: 2021-07-26 | Stop reason: DRUGHIGH

## 2021-06-21 NOTE — TELEPHONE ENCOUNTER
Medication: Pristiq 25 mg   Last refilled: 5/25/21  Last Office Visit: 5/4/21  Next Office Visit: 1/7/22  Pharmacy: [address] [place] [address]

## 2021-07-26 DIAGNOSIS — F32.1 CURRENT MODERATE EPISODE OF MAJOR DEPRESSIVE DISORDER WITHOUT PRIOR EPISODE (HCC): ICD-10-CM

## 2021-07-26 RX ORDER — DESVENLAFAXINE 50 MG/1
50 TABLET, EXTENDED RELEASE ORAL DAILY
Qty: 90 TABLET | Refills: 1 | Status: SHIPPED | OUTPATIENT
Start: 2021-07-26 | End: 2022-02-11 | Stop reason: ALTCHOICE

## 2021-07-26 RX ORDER — DESVENLAFAXINE 25 MG/1
TABLET, EXTENDED RELEASE ORAL
Qty: 60 TABLET | Refills: 1 | Status: CANCELLED | OUTPATIENT
Start: 2021-07-26

## 2021-07-26 NOTE — TELEPHONE ENCOUNTER
Medication refill requested: Desvenlafaxine Succinate ER (Pristiq) 25 MG TB24  Last office visit: 05/04/2021  Next office visit: 01/07/2022  Last refilled: 06/21/2021  Pharmacy:   [address] [place] [address]  Phone: [phone] Fax: [phone]      Pended: 84V6

## 2021-07-26 NOTE — TELEPHONE ENCOUNTER
Please clarify that he is now taking pristique 50 mg daily (two 25 mg)   If yes that is the dose I sent in

## 2021-07-26 NOTE — TELEPHONE ENCOUNTER
Patient called back, would like to know if someone would please send over his meds so that he can pick them up after work today

## 2021-09-07 ENCOUNTER — TELEPHONE (OUTPATIENT)
Dept: PSYCHIATRY | Facility: CLINIC | Age: 36
End: 2021-09-07

## 2021-09-13 ENCOUNTER — PATIENT MESSAGE (OUTPATIENT)
Dept: FAMILY MEDICINE CLINIC | Facility: CLINIC | Age: 36
End: 2021-09-13

## 2021-09-13 DIAGNOSIS — G47.33 OSA (OBSTRUCTIVE SLEEP APNEA): Primary | ICD-10-CM

## 2021-09-23 ENCOUNTER — TELEPHONE (OUTPATIENT)
Dept: SLEEP CENTER | Facility: CLINIC | Age: 36
End: 2021-09-23

## 2021-11-05 ENCOUNTER — CLINICAL SUPPORT (OUTPATIENT)
Dept: FAMILY MEDICINE CLINIC | Facility: CLINIC | Age: 36
End: 2021-11-05
Payer: COMMERCIAL

## 2021-11-05 DIAGNOSIS — Z23 ENCOUNTER FOR IMMUNIZATION: Primary | ICD-10-CM

## 2021-11-05 PROCEDURE — 90686 IIV4 VACC NO PRSV 0.5 ML IM: CPT

## 2021-11-05 PROCEDURE — 90471 IMMUNIZATION ADMIN: CPT

## 2021-11-29 ENCOUNTER — HOSPITAL ENCOUNTER (OUTPATIENT)
Dept: SLEEP CENTER | Facility: CLINIC | Age: 36
Discharge: HOME | End: 2021-11-29
Payer: COMMERCIAL

## 2021-11-29 DIAGNOSIS — G47.33 OSA (OBSTRUCTIVE SLEEP APNEA): ICD-10-CM

## 2021-11-29 PROCEDURE — G0399 HOME SLEEP TEST/TYPE 3 PORTA: HCPCS

## 2021-11-30 ENCOUNTER — TELEPHONE (OUTPATIENT)
Dept: SLEEP CENTER | Facility: CLINIC | Age: 36
End: 2021-11-30

## 2022-01-11 ENCOUNTER — TELEPHONE (OUTPATIENT)
Dept: FAMILY MEDICINE CLINIC | Facility: CLINIC | Age: 37
End: 2022-01-11

## 2022-01-11 NOTE — TELEPHONE ENCOUNTER
Kathryn Hardy at [facility] ([phone] called requesting additional clinicals be faxed supporting sleep study referral    Pt was prescribed a CPAP machine and clinicals are needed      Clinicals can be faxed to Kathryn Hardy at [phone]

## 2022-01-12 NOTE — TELEPHONE ENCOUNTER
Phone call placed to [facility] Study-LM for the office to call the office back to obtain more information on what the office is looking for

## 2022-01-18 NOTE — TELEPHONE ENCOUNTER
Laura Feliz called back From [facility] Sleep study    States that she needs an office note with Documentation for patient needing a sleep study, Chart reviewed office note from 1/6/21 faxed to [phone]

## 2022-02-04 ENCOUNTER — RA CDI HCC (OUTPATIENT)
Dept: OTHER | Facility: HOSPITAL | Age: 37
End: 2022-02-04

## 2022-02-04 PROBLEM — E66.01 SEVERE OBESITY (BMI >= 40) (HCC): Status: ACTIVE | Noted: 2022-02-04

## 2022-02-04 NOTE — PROGRESS NOTES
[facility]  coding opportunities             Chart Reviewed * (Number of) Inbasket suggestions sent to Provider: 1     Problem listed updated  Provider Accepted, (number of) suggestions accepted: 1         Number of suggestions used: 1      Number of suggestions NOT actually used: 0     Patients insurance company: [employer] (Medicare Advantage and [employer])     Visit status: Patient arrived for their scheduled appointment        [place] [address]  coding opportunities             Chart Reviewed * (Number of) Inbasket suggestions sent to Provider: 1     Problem listed updated   Provider Accepted, (number of) suggestions accepted: 1               Patients insurance company: [employer] (Medicare Advantage and [employer])           [place] [address]  coding opportunities             Chart Reviewed * (Number of) Inbasket suggestions sent to Provider: 1                 BMI>40      E66 01 Morbid (severe) obesity due to excess calories ([facility] )   Patients insurance company: [employer] ([employer])

## 2022-02-11 ENCOUNTER — OFFICE VISIT (OUTPATIENT)
Dept: FAMILY MEDICINE CLINIC | Facility: CLINIC | Age: 37
End: 2022-02-11
Payer: COMMERCIAL

## 2022-02-11 VITALS
WEIGHT: 276.6 LBS | TEMPERATURE: 97.5 F | RESPIRATION RATE: 16 BRPM | DIASTOLIC BLOOD PRESSURE: 74 MMHG | OXYGEN SATURATION: 98 % | SYSTOLIC BLOOD PRESSURE: 108 MMHG | HEART RATE: 62 BPM | HEIGHT: 66 IN | BODY MASS INDEX: 44.45 KG/M2

## 2022-02-11 DIAGNOSIS — L72.3 SEBACEOUS CYST: ICD-10-CM

## 2022-02-11 DIAGNOSIS — Z11.59 NEED FOR HEPATITIS C SCREENING TEST: ICD-10-CM

## 2022-02-11 DIAGNOSIS — G47.33 OSA (OBSTRUCTIVE SLEEP APNEA): ICD-10-CM

## 2022-02-11 DIAGNOSIS — E66.01 SEVERE OBESITY (BMI >= 40) (HCC): ICD-10-CM

## 2022-02-11 DIAGNOSIS — Z00.00 PHYSICAL EXAM, ANNUAL: Primary | ICD-10-CM

## 2022-02-11 DIAGNOSIS — E55.9 VITAMIN D DEFICIENCY: ICD-10-CM

## 2022-02-11 PROBLEM — Z98.84 HISTORY OF ROUX-EN-Y GASTRIC BYPASS: Status: ACTIVE | Noted: 2021-01-04

## 2022-02-11 PROBLEM — D50.9 IRON DEFICIENCY ANEMIA: Status: ACTIVE | Noted: 2021-01-04

## 2022-02-11 PROBLEM — E53.8 LOW SERUM VITAMIN B12: Status: ACTIVE | Noted: 2021-03-16

## 2022-02-11 PROCEDURE — 1036F TOBACCO NON-USER: CPT | Performed by: FAMILY MEDICINE

## 2022-02-11 PROCEDURE — 99395 PREV VISIT EST AGE 18-39: CPT | Performed by: FAMILY MEDICINE

## 2022-02-11 PROCEDURE — 3008F BODY MASS INDEX DOCD: CPT | Performed by: FAMILY MEDICINE

## 2022-02-11 PROCEDURE — 3725F SCREEN DEPRESSION PERFORMED: CPT | Performed by: FAMILY MEDICINE

## 2022-02-11 RX ORDER — PANTOPRAZOLE SODIUM 20 MG/1
20 TABLET, DELAYED RELEASE ORAL DAILY
COMMUNITY
End: 2022-06-23 | Stop reason: SDUPTHER

## 2022-02-11 NOTE — PROGRESS NOTES
Assessment/Plan:    No problem-specific Assessment & Plan notes found for this encounter  Diagnoses and all orders for this visit:    Physical exam, annual  Comments:  obtain labs as ordered  healthy diet/exercise/weight loss  make appt with dental  Orders:  -     Comprehensive metabolic panel; Future  -     Lipid panel; Future    Need for hepatitis C screening test  -     Hepatitis C antibody; Future    Vitamin D deficiency  -     Vitamin D 25 hydroxy; Future    Sebaceous cyst  -     Ambulatory Referral to General Surgery; Future    JEAN (obstructive sleep apnea)  Comments:  continue f/u with sleep medicine    Severe obesity (BMI >= 40) (HCC)  Comments:  work on adding exercise back in  healthy diet    Other orders  -     Calcium Carbonate-Vit D-Min (CALCIUM 1200 PO); Take by mouth  -     pantoprazole (PROTONIX) 20 mg tablet; Take 20 mg by mouth daily  -     Probiotic Product (PROBIOTIC-10 PO); Take by mouth        Subjective:      Patient ID: Todd Altamirano is a 39 y o  male  HPI  Pt presents for routine physical   He isn't exercising as much as he had been due to cold weather, but he is planning to pick that up as spring approaches  Due for dental appt  Due for labs  Up to date on imm's  Feeling well emotionally  Due for lipids  Pt seeing h/o for anemia s/p pennie-en-y  Seeing sleep medicine and soon to get cpap for jean  Due for vit D check        The following portions of the patient's history were reviewed and updated as appropriate: allergies, current medications, past family history, past medical history, past social history, past surgical history and problem list     Review of Systems   Constitutional: Negative for chills, fatigue, fever and unexpected weight change  HENT: Negative for congestion, ear pain, hearing loss, postnasal drip, rhinorrhea, sinus pressure, sinus pain, sore throat, trouble swallowing and voice change      Eyes: Negative for pain, redness and visual disturbance  Respiratory: Negative for cough and shortness of breath  Cardiovascular: Negative for chest pain, palpitations and leg swelling  Gastrointestinal: Negative for abdominal pain, constipation, diarrhea and nausea  Endocrine: Negative for cold intolerance, heat intolerance, polydipsia and polyuria  Genitourinary: Negative for dysuria, frequency and urgency  Musculoskeletal: Negative for arthralgias, joint swelling and myalgias  Skin: Negative for rash  No suspicious lesions   Neurological: Negative for weakness, numbness and headaches  Hematological: Negative for adenopathy  Objective:      /74   Pulse 62   Temp 97 5 °F (36 4 °C)   Resp 16   Ht 5' 6" (1 676 m)   Wt 125 kg (276 lb 9 6 oz)   SpO2 98%   BMI 44 64 kg/m²          Physical Exam  Constitutional:       General: He is not in acute distress  Appearance: He is well-developed  He is obese  HENT:      Head: Normocephalic and atraumatic  Right Ear: Tympanic membrane, ear canal and external ear normal       Left Ear: Tympanic membrane, ear canal and external ear normal       Nose: Nose normal       Mouth/Throat:      Pharynx: No oropharyngeal exudate  Eyes:      Conjunctiva/sclera: Conjunctivae normal       Pupils: Pupils are equal, round, and reactive to light  Neck:      Thyroid: No thyromegaly  Vascular: No carotid bruit or JVD  Cardiovascular:      Rate and Rhythm: Regular rhythm  Heart sounds: S1 normal and S2 normal  No murmur heard  No friction rub  No gallop  No S3 or S4 sounds  Pulmonary:      Effort: Pulmonary effort is normal       Breath sounds: Normal breath sounds  No wheezing, rhonchi or rales  Abdominal:      General: Bowel sounds are normal  There is no distension  Palpations: Abdomen is soft  Tenderness: There is no abdominal tenderness  Lymphadenopathy:      Cervical: No cervical adenopathy  Skin:     Findings: Lesion present        Comments: L scalp lesion c/w sebaceous cyst   Neurological:      Mental Status: He is alert and oriented to person, place, and time  Cranial Nerves: No cranial nerve deficit  Sensory: No sensory deficit  Deep Tendon Reflexes: Reflexes are normal and symmetric  BMI Counseling: Body mass index is 44 64 kg/m²  The BMI is above normal  Nutrition recommendations include encouraging healthy choices of fruits and vegetables  Exercise recommendations include exercising 3-5 times per week  Rationale for BMI follow-up plan is due to patient being overweight or obese  Depression Screening and Follow-up Plan: Patient was screened for depression during today's encounter  They screened negative with a PHQ-2 score of 0

## 2022-06-23 DIAGNOSIS — K21.9 GASTROESOPHAGEAL REFLUX DISEASE WITHOUT ESOPHAGITIS: Primary | ICD-10-CM

## 2022-06-23 NOTE — TELEPHONE ENCOUNTER
PLEASE ADVISE ON DOSE Patient states that he takes 40 mg?      Medication refill requested:pantoprazole (PROTONIX) 20 mg tablet     Last office visit:02/11/222  Next office visit: 02/14/2023  Last refilled: Unknown   Labs: No  Ordering Provider: Historical       Pharmacy (select pharmacy send RX to):   [address] [place] [address]  Bernie Ehsan [address] [address]  Phone: [phone] Fax: [phone]

## 2022-06-23 NOTE — TELEPHONE ENCOUNTER
Pt called he only has 2 pills left pt states it 40mg protonix chart says 20mg can it please be sent to [facility]

## 2022-06-23 NOTE — TELEPHONE ENCOUNTER
As Protonix has not been prescribed by our office, please advise patient to reach out to prescribing provider

## 2022-06-24 RX ORDER — PANTOPRAZOLE SODIUM 20 MG/1
20 TABLET, DELAYED RELEASE ORAL DAILY
Qty: 90 TABLET | Refills: 1 | Status: SHIPPED | OUTPATIENT
Start: 2022-06-24 | End: 2022-06-27

## 2022-06-24 NOTE — TELEPHONE ENCOUNTER
Phone call placed to patient patient states that he was seen in the ED a year ago and it really helped seen Corin Rodrigues a year ago was given a year then asked Dr Aleah Nascimento if she would take over the medication and she said yes  Patient would like this medication called in,

## 2022-06-24 NOTE — TELEPHONE ENCOUNTER
Unfortunately, I don't see any info in patient's notes regarding his  Who / what type of doctor / specialist previously Rx Protonix? As Protonix has not been prescribed by our office, please advise patient to reach out to prescribing provider

## 2022-06-27 RX ORDER — PANTOPRAZOLE SODIUM 40 MG/1
40 TABLET, DELAYED RELEASE ORAL
Qty: 30 TABLET | Refills: 5 | Status: SHIPPED | OUTPATIENT
Start: 2022-06-27 | End: 2022-06-28 | Stop reason: SDUPTHER

## 2022-06-28 ENCOUNTER — PATIENT MESSAGE (OUTPATIENT)
Dept: FAMILY MEDICINE CLINIC | Facility: CLINIC | Age: 37
End: 2022-06-28

## 2022-06-28 DIAGNOSIS — K21.9 GASTROESOPHAGEAL REFLUX DISEASE WITHOUT ESOPHAGITIS: ICD-10-CM

## 2022-06-28 RX ORDER — PANTOPRAZOLE SODIUM 40 MG/1
40 TABLET, DELAYED RELEASE ORAL
Qty: 90 TABLET | Refills: 1 | Status: SHIPPED | OUTPATIENT
Start: 2022-06-28

## 2023-01-03 ENCOUNTER — TELEPHONE (OUTPATIENT)
Dept: OTHER | Facility: OTHER | Age: 38
End: 2023-01-03

## 2023-01-03 NOTE — TELEPHONE ENCOUNTER
Patient is requesting a call back to schedule his annual physical  His last visit was 2/11/22  Please call him to schedule

## 2023-01-23 DIAGNOSIS — K21.9 GASTROESOPHAGEAL REFLUX DISEASE WITHOUT ESOPHAGITIS: ICD-10-CM

## 2023-01-23 RX ORDER — PANTOPRAZOLE SODIUM 40 MG/1
40 TABLET, DELAYED RELEASE ORAL
Qty: 90 TABLET | Refills: 1 | Status: SHIPPED | OUTPATIENT
Start: 2023-01-23

## 2023-02-14 ENCOUNTER — OFFICE VISIT (OUTPATIENT)
Dept: FAMILY MEDICINE CLINIC | Facility: CLINIC | Age: 38
End: 2023-02-14

## 2023-02-14 VITALS
TEMPERATURE: 97.8 F | HEART RATE: 71 BPM | OXYGEN SATURATION: 98 % | DIASTOLIC BLOOD PRESSURE: 78 MMHG | SYSTOLIC BLOOD PRESSURE: 120 MMHG | WEIGHT: 280 LBS | BODY MASS INDEX: 45 KG/M2 | HEIGHT: 66 IN

## 2023-02-14 DIAGNOSIS — Z00.00 PHYSICAL EXAM, ANNUAL: Primary | ICD-10-CM

## 2023-02-14 DIAGNOSIS — Z98.84 S/P GASTRIC BYPASS: ICD-10-CM

## 2023-02-14 DIAGNOSIS — Z11.4 SCREENING FOR HIV (HUMAN IMMUNODEFICIENCY VIRUS): ICD-10-CM

## 2023-02-14 DIAGNOSIS — E53.8 LOW SERUM VITAMIN B12: ICD-10-CM

## 2023-02-14 DIAGNOSIS — D50.8 OTHER IRON DEFICIENCY ANEMIA: ICD-10-CM

## 2023-02-14 DIAGNOSIS — R10.9 ABDOMINAL PAIN, UNSPECIFIED ABDOMINAL LOCATION: ICD-10-CM

## 2023-02-14 RX ORDER — PANTOPRAZOLE SODIUM 40 MG/1
40 TABLET, DELAYED RELEASE ORAL 2 TIMES DAILY
Qty: 60 TABLET | Refills: 2 | Status: SHIPPED | OUTPATIENT
Start: 2023-02-14

## 2023-02-14 NOTE — PATIENT INSTRUCTIONS
Obtain fasting labs  Make appt with GI  Increase pantoprazole to 40mg twice daily  Obtain US abdomen

## 2023-02-14 NOTE — PROGRESS NOTES
Name: Azalia Weber      : 1985      MRN: [id number]  Encounter Provider: Mily Yeh DO  Encounter Date: 2023   Encounter department: [address] [place] [address]  Physical exam, annual  Comments:  work on exercise/weight loss  obtain labs  otherwise preventively up to date    2  Screening for HIV (human immunodeficiency virus)  -     : HIV 1/2 AB/AG w Reflex [facility] for 2 yr old and above; Future    3  Abdominal pain, unspecified abdominal location  Comments:  breakthrough reflux sx  increase protonix to BID  refer to GI  obtain US to r/o GB dz  Orders:  -     US abdomen complete; Future; Expected date: 2023  -     pantoprazole (PROTONIX) 40 mg tablet; Take 1 tablet (40 mg total) by mouth 2 (two) times a day  -     Ambulatory Referral to Gastroenterology; Future    4  S/P gastric bypass  Comments:  due for labs  Orders:  -     Comprehensive metabolic panel; Future  -     CBC and differential; Future  -     Lipid panel; Future  -     Hemoglobin A1C; Future  -     TSH, 3rd generation; Future  -     Vitamin B12; Future  -     Vitamin D 25 hydroxy; Future  -     Ferritin; Future  -     Folate; Future  -     Vitamin A; Future  -     Zinc; Future  -     pantoprazole (PROTONIX) 40 mg tablet; Take 1 tablet (40 mg total) by mouth 2 (two) times a day  -     Ambulatory Referral to Gastroenterology; Future    5  Other iron deficiency anemia  -     CBC and differential; Future  -     Ferritin; Future    6  Low serum vitamin B12  -     Vitamin B12; Future           Subjective      HPI   Pt presents for physical exam and with problems  Preventively, pt states he is exercising regularly now that it's warmer  Seeing dentist   Due for labs  Had a flu shot and covid booster recently  From  Problem standpoint, pt has hx of Fe-deficiency anemia s/p pennie-en-y bypass years ago  Had been seeing h/o over at [facility] for iron therapy  No recent labs    Hx of low b12    Needs labs    Pt c/o "indigestion" and "discomfort" after eating  Sometimes takes tums which is somewhat helpful  This has been going on for months  Always UQ  Hx of gastric bypass as above  protonix used to help this, but now it doesn't seem to be doing anything  No n/v/d/c  Review of Systems   Constitutional: Negative for chills, fatigue, fever and unexpected weight change  HENT: Negative for congestion, ear pain, hearing loss, postnasal drip, rhinorrhea, sinus pressure, sinus pain, sore throat, trouble swallowing and voice change  Eyes: Negative for pain, redness and visual disturbance  Respiratory: Negative for cough and shortness of breath  Cardiovascular: Negative for chest pain, palpitations and leg swelling  Gastrointestinal: Positive for abdominal pain  Negative for constipation, diarrhea and nausea  Endocrine: Negative for cold intolerance, heat intolerance, polydipsia and polyuria  Genitourinary: Negative for dysuria, frequency and urgency  Musculoskeletal: Negative for arthralgias, joint swelling and myalgias  Skin: Negative for rash  No suspicious lesions   Neurological: Negative for weakness, numbness and headaches  Hematological: Negative for adenopathy         Current Outpatient Medications on File Prior to Visit   Medication Sig   • Calcium Carbonate-Vit D-Min (CALCIUM 1200 PO) Take by mouth   • Probiotic Product (PROBIOTIC-10 PO) Take by mouth   • Thiamine HCl (B-1 PO) Take by mouth   • [DISCONTINUED] pantoprazole (PROTONIX) 40 mg tablet Take 1 tablet (40 mg total) by mouth daily before breakfast   • cyanocobalamin (VITAMIN B-12) 1000 MCG tablet Take 1,000 mcg by mouth daily   • ferrous sulfate 325 (65 Fe) mg tablet Take 325 mg by mouth 2 (two) times a day   • multivitamin (THERAGRAN) TABS Take 1 tablet by mouth daily       Objective     /78 (BP Location: Left arm, Patient Position: Sitting, Cuff Size: Large)   Pulse 71   Temp 97 8 °F (36 6 °C)   Ht 5' 6" (1 676 m)   Wt 127 kg (280 lb)   SpO2 98%   BMI 45 19 kg/m²     Physical Exam  Constitutional:       General: He is not in acute distress  Appearance: He is well-developed  He is obese  HENT:      Head: Normocephalic and atraumatic  Right Ear: Tympanic membrane, ear canal and external ear normal       Left Ear: Tympanic membrane, ear canal and external ear normal       Nose: Nose normal       Mouth/Throat:      Pharynx: No oropharyngeal exudate  Eyes:      Conjunctiva/sclera: Conjunctivae normal       Pupils: Pupils are equal, round, and reactive to light  Neck:      Thyroid: No thyromegaly  Vascular: No carotid bruit or JVD  Cardiovascular:      Rate and Rhythm: Regular rhythm  Heart sounds: S1 normal and S2 normal  No murmur heard  No friction rub  No gallop  No S3 or S4 sounds  Pulmonary:      Effort: Pulmonary effort is normal       Breath sounds: Normal breath sounds  No wheezing, rhonchi or rales  Abdominal:      General: Bowel sounds are normal  There is no distension  Palpations: Abdomen is soft  Tenderness: There is no abdominal tenderness  Lymphadenopathy:      Cervical: No cervical adenopathy  Neurological:      Mental Status: He is alert and oriented to person, place, and time  Cranial Nerves: No cranial nerve deficit  Sensory: No sensory deficit  Deep Tendon Reflexes: Reflexes are normal and symmetric         Bardolph Fail, DO

## 2023-02-16 LAB
EXTERNAL HIV SCREEN: NORMAL
HBA1C MFR BLD HPLC: 5.4 %

## 2023-03-02 ENCOUNTER — HOSPITAL ENCOUNTER (OUTPATIENT)
Dept: ULTRASOUND IMAGING | Facility: HOSPITAL | Age: 38
Discharge: HOME | End: 2023-03-02
Attending: FAMILY MEDICINE

## 2023-03-02 DIAGNOSIS — R10.9 ABDOMINAL PAIN, UNSPECIFIED ABDOMINAL LOCATION: ICD-10-CM

## 2023-05-02 ENCOUNTER — CONSULT (OUTPATIENT)
Dept: GASTROENTEROLOGY | Facility: AMBULARY SURGERY CENTER | Age: 38
End: 2023-05-02

## 2023-05-02 VITALS
BODY MASS INDEX: 44.36 KG/M2 | HEART RATE: 77 BPM | DIASTOLIC BLOOD PRESSURE: 74 MMHG | WEIGHT: 276 LBS | SYSTOLIC BLOOD PRESSURE: 118 MMHG | OXYGEN SATURATION: 100 % | HEIGHT: 66 IN

## 2023-05-02 DIAGNOSIS — R10.13 DYSPEPSIA: Primary | ICD-10-CM

## 2023-05-02 DIAGNOSIS — R10.9 ABDOMINAL PAIN, UNSPECIFIED ABDOMINAL LOCATION: ICD-10-CM

## 2023-05-02 DIAGNOSIS — Z98.84 S/P GASTRIC BYPASS: ICD-10-CM

## 2023-05-02 NOTE — PROGRESS NOTES
[facility] - Outpatient Consultation  Isa Webster 44 y o  male MRN: [id number]  Encounter: [id number]          ASSESSMENT AND PLAN:      1  Dyspeptic symptoms including generalized abdominal discomfort, gassiness/belching/flatulence, nausea without vomiting; appears to be developing gradually over the last 6 months and did not seem to respond to increase in dose of his lansoprazole to twice daily  Has history of gastric bypass and reports regular alcohol use    -Patient's recent right upper quadrant ultrasound reviewed    -We will plan for EGD    -Procedure was explained in detail to the patient at this time including associated risks and benefits, risks including but not limited to infection, perforation and bleeding    -We will change PPI to Nexium, 40 mg twice daily; prescription sent to patient's pharmacy    -We will also add Carafate 3 times daily    -We will make further decisions regarding continuation or modification of PPI/Carafate therapy pending patient's clinical course and findings from the EGD    -Also advised patient about importance of decreasing his alcohol intake, as alcohol intake is a strong risk factor for peptic ulcer disease in individuals with gastric bypass, additionally he would be at risk for accelerated steatohepatitis/alcoholic liver disease in the setting of his Bisi-en-Y, and regular alcohol use would also complicate the prospect of weight management      ______________________________________________________________________    HPI: 26-year-old male with history of anxiety/depression, Bisi-en-Y gastric bypass approximately 20 years ago, BMI 45 2 who presents for evaluation  He was referred for evaluation of symptoms of abdominal pain, he recently had an ultrasound of the right upper quadrant done in March showing possible trace amount of biliary sludge in the gallbladder neck but no obvious gallstones and no biliary ductal dilation      The patient tells me that he has been on Protonix daily for the last few years, he actually had EGD and colonoscopy at [facility] in March 2021, showing some aphthous ulcers in the TI with nonspecific biopsy findings, he reports he was taking NSAIDs at that time, otherwise colonoscopy showed no polyps and EGD showed some mild erythema in the gastric pouch but no evidence of anastomotic ulcer  Patient says that the acid reducers were working relatively well for a while but about 6 months ago he started noticing they were losing efficacy and he was having symptoms again of stomach rumbling, gassiness, belching and flatulence, nausea without vomiting, and abdominal discomfort, generally experiencing the symptoms on most days, depending to some degree on what he eats  A few months ago his pantoprazole was increased to twice daily but he says this does not seem to have made any difference  His bowel habits are generally regular, his stools are chronically dark with oral iron supplementation that he takes, has not seen his hematologist in about a year  Denies any known family history of inflammatory bowel disease or colon cancer  He does not smoke, denies NSAID use  He says he does drink about 10-15 drinks of beer or whiskey a week on average, denies any known history of liver disease  Has occasional bright red blood per rectum which is sporadic and says this is unchanged and uninterrupted since he had colonoscopy in 2021, which had apparently also noted some hemorrhoids  Reports no significant change in his weight over the last several months  REVIEW OF SYSTEMS:    CONSTITUTIONAL: Denies any fever, chills, rigors, and weight loss  HEENT: No earache or tinnitus  Denies hearing loss or visual disturbances  CARDIOVASCULAR: No chest pain or palpitations  RESPIRATORY: Denies any cough, hemoptysis, shortness of breath or dyspnea on exertion    GASTROINTESTINAL: As noted in the History of Present Illness  GENITOURINARY: No problems with urination  Denies any hematuria or dysuria  NEUROLOGIC: No dizziness or vertigo, denies headaches  MUSCULOSKELETAL: Denies any muscle or joint pain  SKIN: Denies skin rashes or itching  ENDOCRINE: Denies excessive thirst  Denies intolerance to heat or cold  PSYCHOSOCIAL: Denies depression or anxiety  Denies any recent memory loss  Historical Information   Past Medical History:   Diagnosis Date    Anemia     Anxiety     Depression     GERD (gastroesophageal reflux disease)     Morbid obesity ([facility] [address] )     Obesity     Peptic ulcer      Past Surgical History:   Procedure Laterality Date    GASTRIC BYPASS      LA NENA-EN-Y PROCEDURE      TONSILECTOMY AND ADNOIDECTOMY       Social History   Social History     Substance and Sexual Activity   Alcohol Use Yes    Alcohol/week: 12 0 standard drinks    Types: 12 Standard drinks or equivalent per week     Social History     Substance and Sexual Activity   Drug Use Never     Social History     Tobacco Use   Smoking Status Never   Smokeless Tobacco Never     Family History   Problem Relation Age of Onset    Diabetes Father     Depression Mother     Diabetes Maternal Grandmother     Diabetes Maternal Grandfather     Uterine cancer Maternal Aunt     Throat cancer Paternal Aunt     Cancer Paternal Aunt     Cancer Paternal Aunt        Meds/Allergies       Current Outpatient Medications:     Calcium Carbonate-Vit D-Min (CALCIUM 1200 PO)    cyanocobalamin (VITAMIN B-12) 1000 MCG tablet    ferrous sulfate 325 (65 Fe) mg tablet    multivitamin (THERAGRAN) TABS    pantoprazole (PROTONIX) 40 mg tablet    Probiotic Product (PROBIOTIC-10 PO)    Thiamine HCl (B-1 PO)    No Known Allergies        Objective     There were no vitals taken for this visit  There is no height or weight on file to calculate BMI          PHYSICAL EXAM:      General Appearance:   Alert, cooperative, no distress   HEENT:   Normocephalic, atraumatic, anicteric      Neck:  Supple, symmetrical, trachea midline   Lungs:   Clear to auscultation bilaterally; no rales, rhonchi or wheezing; respirations unlabored    Heart[de-identified]   Regular rate and rhythm; no murmur, rub, or gallop  Abdomen:   Soft, non-tender, non-distended; normal bowel sounds; no masses, no organomegaly    Genitalia:   Deferred    Rectal:   Deferred    Extremities:  No cyanosis, clubbing or edema    Pulses:  2+ and symmetric    Skin:  No jaundice, rashes, or lesions    Lymph nodes:  No palpable cervical lymphadenopathy        Lab Results:   No visits with results within 1 Day(s) from this visit  Latest known visit with results is:   Orders Only on 02/16/2023   Component Date Value    Hemoglobin A1C 02/16/2023 5 4     HIV Screen 02/16/2023 Non-Reactive          Radiology Results:   No results found

## 2023-05-02 NOTE — PATIENT INSTRUCTIONS
Scheduled date of EGD(as of today): 6/28/23  Physician performing EGD: Dr Pretty Gonzalez  Location of EGD: [facility]  Instructions reviewed with patient by: Siddharth GUILLEN  Clearances: n/a

## 2023-05-02 NOTE — TELEPHONE ENCOUNTER
Patients GI provider:  Sharalyn Curling PA-C    Number to return call: [phone]    Reason for call: Pt calling stating medication was not sent to pharmacy as discussed at office visit      Scheduled procedure/appointment date if applicable: Procedure 1/00/65

## 2023-05-03 ENCOUNTER — TELEPHONE (OUTPATIENT)
Dept: GASTROENTEROLOGY | Facility: CLINIC | Age: 38
End: 2023-05-03

## 2023-05-03 DIAGNOSIS — R10.13 DYSPEPSIA: Primary | ICD-10-CM

## 2023-05-03 RX ORDER — ESOMEPRAZOLE MAGNESIUM 40 MG/1
40 CAPSULE, DELAYED RELEASE ORAL
Qty: 60 CAPSULE | Refills: 1 | Status: SHIPPED | OUTPATIENT
Start: 2023-05-03 | End: 2023-05-03 | Stop reason: ALTCHOICE

## 2023-05-03 RX ORDER — OMEPRAZOLE 40 MG/1
40 CAPSULE, DELAYED RELEASE ORAL
Qty: 180 CAPSULE | Refills: 0 | Status: SHIPPED | OUTPATIENT
Start: 2023-05-03 | End: 2023-08-01

## 2023-05-03 RX ORDER — SUCRALFATE 1 G/1
1 TABLET ORAL 3 TIMES DAILY
Qty: 90 TABLET | Refills: 0 | Status: SHIPPED | OUTPATIENT
Start: 2023-05-03 | End: 2023-05-03 | Stop reason: ALTCHOICE

## 2023-05-03 NOTE — TELEPHONE ENCOUNTER
Received a call from pharmacy stating sucralfate tablet isnt available and the suspension requires PA  Pharmacy advised script can be sent to another pharmacy or PA can be completed

## 2023-05-03 NOTE — TELEPHONE ENCOUNTER
New prescription for Omeprazole sent    Spoke to patient and he does not want to switch to a different pharmacy or take liquid Carafate so we will discontinue Carafate

## 2023-05-03 NOTE — TELEPHONE ENCOUNTER
Spoke with pharmacy, nexium is not covered by insurance but omeprazole is     Spoke with patient to inform him, he is okay with trying omeprazole but would like a 3 month supply

## 2023-06-14 ENCOUNTER — ANESTHESIA EVENT (OUTPATIENT)
Dept: ANESTHESIOLOGY | Facility: HOSPITAL | Age: 38
End: 2023-06-14

## 2023-06-14 ENCOUNTER — ANESTHESIA (OUTPATIENT)
Dept: ANESTHESIOLOGY | Facility: HOSPITAL | Age: 38
End: 2023-06-14

## 2023-06-14 ENCOUNTER — TELEPHONE (OUTPATIENT)
Dept: GASTROENTEROLOGY | Facility: CLINIC | Age: 38
End: 2023-06-14

## 2023-06-14 NOTE — TELEPHONE ENCOUNTER
Advised patient that procedure needed to be rescheduled as he needs to been seen in the hospital  Patient is aware of seeing a different provider but wanted soonest appointment   Patient is scheduled with Dr Lora Franks 6/29/23 and [facility]

## 2023-06-29 ENCOUNTER — ANESTHESIA (OUTPATIENT)
Dept: GASTROENTEROLOGY | Facility: HOSPITAL | Age: 38
End: 2023-06-29

## 2023-06-29 ENCOUNTER — HOSPITAL ENCOUNTER (OUTPATIENT)
Dept: GASTROENTEROLOGY | Facility: HOSPITAL | Age: 38
Setting detail: OUTPATIENT SURGERY
End: 2023-06-29
Payer: COMMERCIAL

## 2023-06-29 ENCOUNTER — ANESTHESIA EVENT (OUTPATIENT)
Dept: GASTROENTEROLOGY | Facility: HOSPITAL | Age: 38
End: 2023-06-29

## 2023-06-29 VITALS
WEIGHT: 273.4 LBS | SYSTOLIC BLOOD PRESSURE: 106 MMHG | BODY MASS INDEX: 40.49 KG/M2 | OXYGEN SATURATION: 99 % | RESPIRATION RATE: 16 BRPM | DIASTOLIC BLOOD PRESSURE: 75 MMHG | HEART RATE: 69 BPM | HEIGHT: 69 IN | TEMPERATURE: 96.4 F

## 2023-06-29 DIAGNOSIS — Z98.84 S/P GASTRIC BYPASS: ICD-10-CM

## 2023-06-29 DIAGNOSIS — R10.13 DYSPEPSIA: ICD-10-CM

## 2023-06-29 DIAGNOSIS — R10.9 ABDOMINAL PAIN, UNSPECIFIED ABDOMINAL LOCATION: ICD-10-CM

## 2023-06-29 PROCEDURE — 88305 TISSUE EXAM BY PATHOLOGIST: CPT | Performed by: PATHOLOGY

## 2023-06-29 RX ORDER — SODIUM CHLORIDE, SODIUM LACTATE, POTASSIUM CHLORIDE, CALCIUM CHLORIDE 600; 310; 30; 20 MG/100ML; MG/100ML; MG/100ML; MG/100ML
INJECTION, SOLUTION INTRAVENOUS CONTINUOUS PRN
Status: DISCONTINUED | OUTPATIENT
Start: 2023-06-29 | End: 2023-06-29

## 2023-06-29 RX ORDER — GLYCOPYRROLATE 0.2 MG/ML
INJECTION INTRAMUSCULAR; INTRAVENOUS AS NEEDED
Status: DISCONTINUED | OUTPATIENT
Start: 2023-06-29 | End: 2023-06-29

## 2023-06-29 RX ORDER — FENTANYL CITRATE 50 UG/ML
INJECTION, SOLUTION INTRAMUSCULAR; INTRAVENOUS AS NEEDED
Status: DISCONTINUED | OUTPATIENT
Start: 2023-06-29 | End: 2023-06-29

## 2023-06-29 RX ORDER — LIDOCAINE HYDROCHLORIDE 10 MG/ML
INJECTION, SOLUTION EPIDURAL; INFILTRATION; INTRACAUDAL; PERINEURAL AS NEEDED
Status: DISCONTINUED | OUTPATIENT
Start: 2023-06-29 | End: 2023-06-29

## 2023-06-29 RX ORDER — PROPOFOL 10 MG/ML
INJECTION, EMULSION INTRAVENOUS AS NEEDED
Status: DISCONTINUED | OUTPATIENT
Start: 2023-06-29 | End: 2023-06-29

## 2023-06-29 RX ADMIN — FENTANYL CITRATE 25 MCG: 50 INJECTION, SOLUTION INTRAMUSCULAR; INTRAVENOUS at 10:11

## 2023-06-29 RX ADMIN — GLYCOPYRROLATE 0.1 MG: 0.2 INJECTION, SOLUTION INTRAMUSCULAR; INTRAVENOUS at 10:11

## 2023-06-29 RX ADMIN — PROPOFOL 150 MG: 10 INJECTION, EMULSION INTRAVENOUS at 10:17

## 2023-06-29 RX ADMIN — SODIUM CHLORIDE, SODIUM LACTATE, POTASSIUM CHLORIDE, AND CALCIUM CHLORIDE: .6; .31; .03; .02 INJECTION, SOLUTION INTRAVENOUS at 10:01

## 2023-06-29 RX ADMIN — PROPOFOL 50 MG: 10 INJECTION, EMULSION INTRAVENOUS at 10:24

## 2023-06-29 RX ADMIN — PROPOFOL 50 MG: 10 INJECTION, EMULSION INTRAVENOUS at 10:29

## 2023-06-29 RX ADMIN — FENTANYL CITRATE 25 MCG: 50 INJECTION, SOLUTION INTRAMUSCULAR; INTRAVENOUS at 10:20

## 2023-06-29 RX ADMIN — PROPOFOL 100 MG: 10 INJECTION, EMULSION INTRAVENOUS at 10:20

## 2023-06-29 RX ADMIN — PROPOFOL 50 MG: 10 INJECTION, EMULSION INTRAVENOUS at 10:27

## 2023-06-29 RX ADMIN — LIDOCAINE HYDROCHLORIDE 100 MG: 10 INJECTION, SOLUTION EPIDURAL; INFILTRATION; INTRACAUDAL; PERINEURAL at 10:16

## 2023-06-29 RX ADMIN — PROPOFOL 50 MG: 10 INJECTION, EMULSION INTRAVENOUS at 10:31

## 2023-06-29 NOTE — ANESTHESIA PREPROCEDURE EVALUATION
Procedure:  EGD    Relevant Problems   HEMATOLOGY   (+) Iron deficiency anemia      NEURO/PSYCH   (+) SHELLY (generalized anxiety disorder)      PULMONARY   (+) JEAN (obstructive sleep apnea)        Physical Exam    Airway    Mallampati score: II         Dental   No notable dental hx     Cardiovascular      Pulmonary      Other Findings        Anesthesia Plan  ASA Score- 3     Anesthesia Type- IV sedation with anesthesia with ASA Monitors  Additional Monitors:   Airway Plan:     Comment: I, Dr Mert Salinas, the attending physician, have personally seen and evaluated the patient prior to anesthetic care  I have reviewed the pre-anesthetic record, and other medical records if appropriate to the anesthetic care  If a CRNA is involved in the case, I have reviewed the CRNA assessment, if present, and agree  The patient is in a suitable condition to proceed with my formulated anesthetic plan          Plan Factors-    Chart reviewed  Induction- intravenous  Postoperative Plan-     Informed Consent- Anesthetic plan and risks discussed with patient  I personally reviewed this patient with the CRNA  Discussed and agreed on the Anesthesia Plan with the CRNA  Maryanne Harrell

## 2023-06-29 NOTE — H&P
History and Physical - [facility]  Abdias Merida 44 y o  male MRN: [id number]                  HPI: Abdias Merida is a 44y o  year old male who presents for nausea, dyspepsia  REVIEW OF SYSTEMS: Per the HPI, and otherwise unremarkable  Historical Information   Past Medical History:   Diagnosis Date   • Anemia    • Anxiety    • Depression    • GERD (gastroesophageal reflux disease)    • Morbid obesity ([facility] [address] )    • Obesity    • Peptic ulcer      Past Surgical History:   Procedure Laterality Date   • BARIATRIC SURGERY     • COLONOSCOPY     • GASTRIC BYPASS     • LA NENA-EN-Y PROCEDURE     • TONSILECTOMY AND ADNOIDECTOMY     • UPPER GASTROINTESTINAL ENDOSCOPY       Social History   Social History     Substance and Sexual Activity   Alcohol Use Yes   • Alcohol/week: 12 0 standard drinks of alcohol   • Types: 12 Standard drinks or equivalent per week     Social History     Substance and Sexual Activity   Drug Use Never     Social History     Tobacco Use   Smoking Status Never   Smokeless Tobacco Never     Family History   Problem Relation Age of Onset   • Diabetes Father    • Depression Mother    • Diabetes Maternal Grandmother    • Diabetes Maternal Grandfather    • Uterine cancer Maternal Aunt    • Throat cancer Paternal Aunt    • Cancer Paternal Aunt    • Cancer Paternal Aunt        Meds/Allergies       Current Outpatient Medications:   •  Calcium Carbonate-Vit D-Min (CALCIUM 1200 PO)  •  omeprazole (PriLOSEC) 40 MG capsule  •  Probiotic Product (PROBIOTIC-10 PO)  •  Thiamine HCl (B-1 PO)  •  cyanocobalamin (VITAMIN B-12) 1000 MCG tablet  •  ferrous sulfate 325 (65 Fe) mg tablet  •  multivitamin (THERAGRAN) TABS  No current facility-administered medications for this encounter      Facility-Administered Medications Ordered in Other Encounters:   •  lactated ringers infusion, , Intravenous, Continuous PRN, New Bag at 06/29/23 1001    No Known Allergies    Objective     /71 "Pulse 69   Temp (!) 96 9 °F (36 1 °C) (Temporal)   Resp 16   Ht 5' 9\" (1 753 m)   Wt 124 kg (273 lb 6 4 oz)   SpO2 98%   BMI 40 37 kg/m²       PHYSICAL EXAM    Gen: NAD  Head: NCAT  CV: RRR  CHEST: Clear  ABD: soft, NT/ND  EXT: no edema      ASSESSMENT/PLAN:  This is a 44y o  year old male here for EGD, and he is stable and optimized for his procedure          "

## 2023-06-29 NOTE — ANESTHESIA POSTPROCEDURE EVALUATION
Post-Op Assessment Note    CV Status:  Stable  Pain Score: 0    Pain management: adequate     Mental Status:  Alert and awake   Hydration Status:  Euvolemic   PONV Controlled:  Controlled   Airway Patency:  Patent   Two or more mitigation strategies used for obstructive sleep apnea   Post Op Vitals Reviewed: Yes      Staff: Anesthesiologist, CRNA         No notable events documented      BP   118/88   Temp     Pulse  88   Resp   16   SpO2   100 Parent

## 2023-07-08 PROCEDURE — 88305 TISSUE EXAM BY PATHOLOGIST: CPT | Performed by: PATHOLOGY

## 2023-07-20 ENCOUNTER — TELEPHONE (OUTPATIENT)
Age: 38
End: 2023-07-20

## 2023-07-20 DIAGNOSIS — R10.13 DYSPEPSIA: ICD-10-CM

## 2023-07-20 NOTE — TELEPHONE ENCOUNTER
Patients GI provider:  Dori Mcclure    Number to return call: [phone]    Reason for call: Patient calling for results of EGD completed on 6/29/23. Patient wanting to know if there needs to be a change in medication or what the next step would be.      Scheduled procedure/appointment date if applicable: N/A

## 2023-07-21 DIAGNOSIS — R10.13 DYSPEPSIA: ICD-10-CM

## 2023-07-21 RX ORDER — OMEPRAZOLE 40 MG/1
40 CAPSULE, DELAYED RELEASE ORAL
Qty: 180 CAPSULE | Refills: 1 | Status: SHIPPED | OUTPATIENT
Start: 2023-07-21 | End: 2023-08-08

## 2023-07-21 RX ORDER — SUCRALFATE 1 G/1
1 TABLET ORAL
Qty: 90 TABLET | Refills: 3 | Status: SHIPPED | OUTPATIENT
Start: 2023-07-21

## 2023-07-21 RX ORDER — OMEPRAZOLE 40 MG/1
40 CAPSULE, DELAYED RELEASE ORAL
Qty: 180 CAPSULE | Refills: 0 | Status: CANCELLED | OUTPATIENT
Start: 2023-07-21 | End: 2023-10-19

## 2023-07-21 NOTE — TELEPHONE ENCOUNTER
I contacted patient and reviewed EGD results noted biopsies normal. Patient continues with GERD symptoms, noting very minimal change  taking the omeprazole 40 mg BID. He does need refill on medication at this time 90 day supply requested. Please sign off of refill. He is following a reflux diet for majority of time. I did schedule him for next available appointment 8/8/23 with Dr. Tameka Munguia. He was placed on wait list for earlier appointment.

## 2023-07-21 NOTE — TELEPHONE ENCOUNTER
Renew omeprazole twice daily. Would advise the patient continue with antireflux diet and measures. I would also recommend him that we restart the Carafate which he took previously until his follow-up to see if this helps with his symptoms.   I ordered this as well

## 2023-08-08 ENCOUNTER — OFFICE VISIT (OUTPATIENT)
Dept: GASTROENTEROLOGY | Facility: AMBULARY SURGERY CENTER | Age: 38
End: 2023-08-08
Payer: COMMERCIAL

## 2023-08-08 VITALS
WEIGHT: 275.4 LBS | HEART RATE: 67 BPM | DIASTOLIC BLOOD PRESSURE: 86 MMHG | BODY MASS INDEX: 40.79 KG/M2 | SYSTOLIC BLOOD PRESSURE: 124 MMHG | HEIGHT: 69 IN | OXYGEN SATURATION: 98 %

## 2023-08-08 DIAGNOSIS — R10.13 DYSPEPSIA: Primary | ICD-10-CM

## 2023-08-08 DIAGNOSIS — Z98.84 S/P GASTRIC BYPASS: ICD-10-CM

## 2023-08-08 DIAGNOSIS — R10.10 PAIN OF UPPER ABDOMEN: ICD-10-CM

## 2023-08-08 PROCEDURE — 99214 OFFICE O/P EST MOD 30 MIN: CPT | Performed by: INTERNAL MEDICINE

## 2023-08-08 RX ORDER — PANTOPRAZOLE SODIUM 40 MG/1
40 TABLET, DELAYED RELEASE ORAL DAILY
Qty: 90 TABLET | Refills: 3 | Status: SHIPPED | OUTPATIENT
Start: 2023-08-08

## 2023-08-08 RX ORDER — DICYCLOMINE HCL 20 MG
20 TABLET ORAL
Qty: 120 TABLET | Refills: 5 | Status: SHIPPED | OUTPATIENT
Start: 2023-08-08

## 2023-08-08 NOTE — PATIENT INSTRUCTIONS
Stop prilosec/omeprazole  - take 1 pill daily x 1 week  - take 1 pill every other day x 1 week  - then stop

## 2023-08-08 NOTE — PROGRESS NOTES
[facility] - Outpatient Consultation  Susan Webster 44 y.o. male MRN: [id number]  Encounter: [id number]      PCP: Felizardo Curling, DO  Referring: No referring provider defined for this encounter. ASSESSMENT AND PLAN:      1. Dyspepsia  2. S/P gastric bypass  3. Pain of upper abdomen  Predominance of symptoms of postprandial dyspepsia and pain of the upper abdomen, associated with belching and flatus  Therapeutic failure of omeprazole, would discontinue this medication with titration as described, consider readdition of Protonix  Discontinue Carafate given therapeutic failure  Recommend dicyclomine, we discussed starting once daily prior to a meal, and slowly uptitrating  - dicyclomine (BENTYL) 20 mg tablet; Take 1 tablet (20 mg total) by mouth 4 (four) times a day (before meals and at bedtime)  Dispense: 120 tablet; Refill: 5  - pantoprazole (PROTONIX) 40 mg tablet; Take 1 tablet (40 mg total) by mouth daily  Dispense: 90 tablet; Refill: 3  - counseled on alcohol cessation    ______________________________________________________________________    CC:  Chief Complaint   Patient presents with   • Follow-up     From EGD       HPI:      Patient is a 43-year-old male who is seen in office visit follow-up. He underwent EGD and June 2023 performed by me, with evidence of erythema/gastritis, postoperative changes consistent with his Bisi-en-Y gastric bypass, and biopsies were negative for H. pylori, celiac or alternative etiology. He does continue to drink at least 8-10 drinks on the weekends. His symptoms are overall unchanged, after eating, chronically and for the last several years he has been experiencing gas, belching, abdominal discomfort. Symptoms last for approximately 30 minutes after eating. For several years he did have significant relief of his symptoms with daily Protonix, this eventually lost its efficacy, and he has been on omeprazole without improvement.   He is additionally added Carafate, without improvement. He reports regular bowel movements that occur daily, they do occasionally vary in consistency, loose at times. He denies any rectal bleeding. His last colonoscopy was performed in 2021 at [facility]. Abdominal Pain  This is a chronic problem. The current episode started more than 1 month ago. The onset quality is sudden. The problem occurs daily. The most recent episode lasted 1 hours. The problem has been unchanged. The pain is located in the generalized abdominal region. The pain is at a severity of 5/10. The quality of the pain is cramping. The abdominal pain does not radiate. Associated symptoms include belching, diarrhea, flatus, hematochezia, melena and nausea. Pertinent negatives include no anorexia, arthralgias, constipation, dysuria, fever, frequency, headaches, hematuria, myalgias, vomiting or weight loss. Nothing aggravates the pain. The pain is relieved by being still, belching, bowel movements and passing flatus. Prior diagnostic workup includes lower endoscopy, ultrasound and upper endoscopy. REVIEW OF SYSTEMS:    CONSTITUTIONAL: Denies any fever, chills, rigors, and weight loss. HEENT: No earache or tinnitus. Denies hearing loss or visual disturbances. CARDIOVASCULAR: No chest pain or palpitations. RESPIRATORY: Denies any cough, hemoptysis, shortness of breath or dyspnea on exertion. GASTROINTESTINAL: As noted in the History of Present Illness. GENITOURINARY: No problems with urination. Denies any hematuria or dysuria. NEUROLOGIC: No dizziness or vertigo, denies headaches. MUSCULOSKELETAL: Denies any muscle or joint pain. SKIN: Denies skin rashes or itching. ENDOCRINE: Denies excessive thirst. Denies intolerance to heat or cold. PSYCHOSOCIAL: Denies depression or anxiety. Denies any recent memory loss.        Historical Information   Past Medical History:   Diagnosis Date   • Anemia    • Anxiety    • Depression    • GERD (gastroesophageal reflux disease)    • Morbid obesity ([address])    • Obesity    • Peptic ulcer      Past Surgical History:   Procedure Laterality Date   • BARIATRIC SURGERY     • COLONOSCOPY     • GASTRIC BYPASS     • LA NENA-EN-Y PROCEDURE     • TONSILECTOMY AND ADNOIDECTOMY     • UPPER GASTROINTESTINAL ENDOSCOPY       Social History   Social History     Substance and Sexual Activity   Alcohol Use Yes   • Alcohol/week: 12.0 standard drinks of alcohol   • Types: 12 Standard drinks or equivalent per week     Social History     Substance and Sexual Activity   Drug Use Never     Social History     Tobacco Use   Smoking Status Never   Smokeless Tobacco Never     Family History   Problem Relation Age of Onset   • Diabetes Father    • Depression Mother    • Diabetes Maternal Grandmother    • Diabetes Maternal Grandfather    • Uterine cancer Maternal Aunt    • Throat cancer Paternal Aunt    • Cancer Paternal Aunt    • Cancer Paternal Aunt        Meds/Allergies       Current Outpatient Medications:   •  Calcium Carbonate-Vit D-Min (CALCIUM 1200 PO)  •  cyanocobalamin (VITAMIN B-12) 1000 MCG tablet  •  ferrous sulfate 325 (65 Fe) mg tablet  •  multivitamin (THERAGRAN) TABS  •  omeprazole (PriLOSEC) 40 MG capsule  •  Probiotic Product (PROBIOTIC-10 PO)  •  sucralfate (CARAFATE) 1 g tablet  •  Thiamine HCl (B-1 PO)    No Known Allergies        Objective     Blood pressure 124/86, pulse 67, height 5' 9" (1.753 m), weight 125 kg (275 lb 6.4 oz), SpO2 98 %. Body mass index is 40.67 kg/m². PHYSICAL EXAM:      General Appearance:   Alert, cooperative, no distress   HEENT:   Normocephalic, atraumatic, anicteric. Neck:  Supple, symmetrical, trachea midline   Lungs:   Clear to auscultation bilaterally; no rales, rhonchi or wheezing; respirations unlabored    Heart[de-identified]   Regular rate and rhythm; no murmur, rub, or gallop.    Abdomen:   Soft, non-tender, non-distended; normal bowel sounds; no masses, no organomegaly    Genitalia: Deferred    Rectal:   Deferred    Extremities:  No cyanosis, clubbing or edema    Pulses:  2+ and symmetric    Skin:  No jaundice, rashes, or lesions    Lymph nodes:  No palpable cervical lymphadenopathy        Lab Results:     Lab Results   Component Value Date    WBC 5.50 01/11/2021    HGB 8.9 (L) 01/11/2021    HCT 34.0 (L) 01/11/2021    MCV 74 (L) 01/11/2021     01/11/2021       No results found for: "NA", "K", "CL", "CO2", "ANIONGAP", "BUN", "CREATININE", "GLUCOSE", "GLUF", "CALCIUM", "CORRECTEDCA", "AST", "ALT", "ALKPHOS", "PROT", "BILITOT", "EGFR"    No results found for: "INR", "PROTIME"      Radiology Results:   No results found. Portions of the record may have been created with voice recognition software. Occasional wrong word or "sound a like" substitutions may have occurred due to the inherent limitations of voice recognition software. Read the chart carefully and recognize, using context, where substitutions have occurred.

## 2023-10-23 ENCOUNTER — OFFICE VISIT (OUTPATIENT)
Dept: GASTROENTEROLOGY | Facility: AMBULARY SURGERY CENTER | Age: 38
End: 2023-10-23
Payer: COMMERCIAL

## 2023-10-23 VITALS
BODY MASS INDEX: 41.03 KG/M2 | HEIGHT: 69 IN | HEART RATE: 79 BPM | SYSTOLIC BLOOD PRESSURE: 120 MMHG | OXYGEN SATURATION: 99 % | WEIGHT: 277 LBS | DIASTOLIC BLOOD PRESSURE: 82 MMHG

## 2023-10-23 DIAGNOSIS — R10.13 DYSPEPSIA: ICD-10-CM

## 2023-10-23 DIAGNOSIS — R10.10 PAIN OF UPPER ABDOMEN: ICD-10-CM

## 2023-10-23 DIAGNOSIS — Z98.84 S/P GASTRIC BYPASS: Primary | ICD-10-CM

## 2023-10-23 PROCEDURE — 99213 OFFICE O/P EST LOW 20 MIN: CPT | Performed by: PHYSICIAN ASSISTANT

## 2023-10-23 RX ORDER — PANTOPRAZOLE SODIUM 20 MG/1
20 TABLET, DELAYED RELEASE ORAL
Qty: 90 TABLET | Refills: 3 | Status: SHIPPED | OUTPATIENT
Start: 2023-10-23

## 2023-10-23 NOTE — PROGRESS NOTES
[facility] - Outpatient Follow-up Note  Dulce Pimentel 43 y.o. male MRN: [id number]  Encounter: [id number]          ASSESSMENT AND PLAN:      50-year-old male with history of gastric bypass who presents for follow-up for dyspepsia and upper abdominal pain with negative work-up including EGD, ultrasound, failure of omeprazole, esomeprazole, lansoprazole and Carafate. Dyspepsia with upper abdominal discomfort  Symptoms improved since starting Bentyl at last office visit. He is taking this twice a day and doing very well at this time. He is not currently taking a PPI. He still drinks alcohol, around 9 drinks over the weekend. Denies any significant NSAID or tobacco use. -Patient is doing well on Bentyl. We will continue this. Discussed possible side effect of constipation and recommend reaching out if this were to occur.  - Due to patient's alcohol use and history of ulcers regarding his gastric bypass, would recommend starting low-dose Protonix. This was sent.  -Continue to avoid any known food triggers such as bicycle, fatty food, greasy food, tomato-based products. Can use Tums or Pepcid as needed for these. Patient was recommended to follow up in 6 months. Patient advised to reach out via [employer] with any questions or concerns in the meantime. ______________________________________________________________________    SUBJECTIVE:      Dulce Pimentel is a 43 y.o. male with history of gastric bypass 20 years ago, JEAN who presents the office for follow-up. Patient was last seen in the office 8/2023 for symptoms of dyspepsia and upper abdominal pain. He reports failure of omeprazole and Carafate. He was recommended dicyclomine and Protonix. Patient reports doing very well since last office visit. He reports since starting Bentyl twice a day his symptoms are well controlled. He is currently not taking any Protonix.   He reports having occasional symptoms of dyspepsia with food triggers which are very random but sometimes include fatty or greasy foods. He will use Tums for this. He reports his bowel movements are regular. Occasional blood which he attributes to hemorrhoids. Abdominal surgeries consistent for gastric bypass. No known family history of colon cancer. Denies smoking use. Reports drinking around 9 drinks over the weekend. Last EGD 6/2023 with mild erythema of the antrum and biopsies negative for H. pylori, celiac and EOE. Prior colonoscopy through [facility] 3/2021. He believes he was due for repeat at regular colon cancer screening age. We will plan for this at age 38. REVIEW OF SYSTEMS IS OTHERWISE NEGATIVE.       Historical Information   Past Medical History:   Diagnosis Date    Anemia     Anxiety     Depression     GERD (gastroesophageal reflux disease)     Morbid obesity ([address])     Obesity     Peptic ulcer      Past Surgical History:   Procedure Laterality Date    BARIATRIC SURGERY      COLONOSCOPY      GASTRIC BYPASS      LA NENA-EN-Y PROCEDURE      TONSILECTOMY AND ADNOIDECTOMY      UPPER GASTROINTESTINAL ENDOSCOPY       Social History   Social History     Substance and Sexual Activity   Alcohol Use Yes    Alcohol/week: 12.0 standard drinks of alcohol    Types: 12 Standard drinks or equivalent per week     Social History     Substance and Sexual Activity   Drug Use Never     Social History     Tobacco Use   Smoking Status Never   Smokeless Tobacco Never     Family History   Problem Relation Age of Onset    Diabetes Father     Depression Mother     Diabetes Maternal Grandmother     Diabetes Maternal Grandfather     Uterine cancer Maternal Aunt     Throat cancer Paternal Aunt     Cancer Paternal Aunt     Cancer Paternal Aunt        Meds/Allergies       Current Outpatient Medications:     Calcium Carbonate-Vit D-Min (CALCIUM 1200 PO)    dicyclomine (BENTYL) 20 mg tablet    ferrous sulfate 325 (65 Fe) mg tablet    multivitamin (THERAGRAN) TABS    pantoprazole (PROTONIX) 20 mg tablet    Probiotic Product (PROBIOTIC-10 PO)    Thiamine HCl (B-1 PO)    cyanocobalamin (VITAMIN B-12) 1000 MCG tablet    No Known Allergies        Objective     Blood pressure 120/82, pulse 79, height 5' 9" (1.753 m), weight 126 kg (277 lb), SpO2 99 %. Body mass index is 40.91 kg/m². PHYSICAL EXAM:      General Appearance:   Alert, cooperative, no distress   HEENT:   Normocephalic, atraumatic, anicteric. Neck:  Supple, symmetrical, trachea midline   Lungs:   Clear to auscultation bilaterally; no rales, rhonchi or wheezing; respirations unlabored    Heart[de-identified]   Regular rate and rhythm; no murmur, rub, or gallop. Abdomen:   Soft, non-tender, non-distended; normal bowel sounds; no masses, no organomegaly. Benign abdomen. Genitalia:   Deferred    Rectal:   Deferred    Extremities:  No cyanosis, clubbing or edema    Pulses:  2+ and symmetric    Skin:  No jaundice, rashes, or lesions    Lymph nodes:  No palpable cervical lymphadenopathy        Lab Results:   No visits with results within 1 Day(s) from this visit.    Latest known visit with results is:   Hospital Outpatient Visit on 06/29/2023   Component Date Value    Case Report 06/29/2023                      Value:Surgical Pathology Report                         Case: [id number]                                   Authorizing Provider:  Bradly Prado MD          Collected:           06/29/2023 1024              Ordering Location:     [facility]        Received:            06/29/2023 [address] Endoscopy                                                           Pathologist:           Ana Conti MD                                                             Specimens:   A) - Small Bowel, NOS, small bowel bx- r/o celiac                                                   B) - Stomach, stomach- r/o Hpylori, gastritis C) - Esophagus, mid esophagus- r/o EOE                                                     Final Diagnosis 06/29/2023                      Value: This result contains rich text formatting which cannot be displayed here. Additional Information 06/29/2023                      Value: This result contains rich text formatting which cannot be displayed here. Gross Description 06/29/2023                      Value: This result contains rich text formatting which cannot be displayed here. Radiology Results:   No results found.

## 2024-02-05 ENCOUNTER — TELEPHONE (OUTPATIENT)
Age: 39
End: 2024-02-05

## 2024-02-05 DIAGNOSIS — Z98.84 S/P GASTRIC BYPASS: ICD-10-CM

## 2024-02-05 DIAGNOSIS — Z00.00 PHYSICAL EXAM, ANNUAL: Primary | ICD-10-CM

## 2024-02-05 NOTE — TELEPHONE ENCOUNTER
Mau is scheduled for his Physical .  He would like new lab orders to get done prior to visit. His current labs  next week.     Please advise, thank you!

## 2024-02-27 ENCOUNTER — OFFICE VISIT (OUTPATIENT)
Dept: FAMILY MEDICINE CLINIC | Facility: CLINIC | Age: 39
End: 2024-02-27
Payer: COMMERCIAL

## 2024-02-27 VITALS
DIASTOLIC BLOOD PRESSURE: 74 MMHG | BODY MASS INDEX: 42.54 KG/M2 | WEIGHT: 287.2 LBS | HEIGHT: 69 IN | RESPIRATION RATE: 16 BRPM | SYSTOLIC BLOOD PRESSURE: 124 MMHG | HEART RATE: 86 BPM | TEMPERATURE: 97 F | OXYGEN SATURATION: 99 %

## 2024-02-27 DIAGNOSIS — L72.3 SEBACEOUS CYST: ICD-10-CM

## 2024-02-27 DIAGNOSIS — Z00.00 PHYSICAL EXAM, ANNUAL: Primary | ICD-10-CM

## 2024-02-27 LAB — HBA1C MFR BLD HPLC: 5.6 %

## 2024-02-27 PROCEDURE — 99395 PREV VISIT EST AGE 18-39: CPT | Performed by: FAMILY MEDICINE

## 2024-02-27 PROCEDURE — 99213 OFFICE O/P EST LOW 20 MIN: CPT | Performed by: FAMILY MEDICINE

## 2024-02-27 RX ORDER — CEPHALEXIN 500 MG/1
500 CAPSULE ORAL 3 TIMES DAILY
Qty: 21 CAPSULE | Refills: 0 | Status: SHIPPED | OUTPATIENT
Start: 2024-02-27 | End: 2024-03-05

## 2024-02-27 NOTE — PROGRESS NOTES
Name: Mau Webster      : 1985      MRN: [id number]  Encounter Provider: Elida Encinas DO  Encounter Date: 2024   Encounter department: [facility]    Assessment & Plan     1. Physical exam, annual  Comments:  await labs  covid shot at pharmacy  healthy diet and exercise  otherwise preventively up to date    2. Sebaceous cyst  Comments:  suspect this is a cyst vs lipoma--can't tell whether infected or just irritated from rubbing  keflex TID x 7 days  refer to surgery for eval  Orders:  -     cephalexin (KEFLEX) 500 mg capsule; Take 1 capsule (500 mg total) by mouth 3 (three) times a day for 7 days  -     Ambulatory Referral to General Surgery; Future           Subjective      HPI  Pt presents for physical exam.  [facility] this [facility].  Awaiting same.  Had flu shot at [facility].  Due for covid booster.  Seeing dentist.  Plans to start exercising more as the weather improves.    Seeing gi for dyspepsia s/p pennie-en-y bypass.  Protonix started due to etoh use and gastric bypass hx.    Painful bump on R buttocks over the last week.  No d/c.  No fevers. A bit red.  Painful when he sits    Review of Systems   Constitutional:  Negative for chills, fatigue, fever and unexpected weight change.   HENT:  Negative for congestion, ear pain, hearing loss, postnasal drip, rhinorrhea, sinus pressure, sinus pain, sore throat, trouble swallowing and voice change.    Eyes:  Negative for pain, redness and visual disturbance.   Respiratory:  Negative for cough and shortness of breath.    Cardiovascular:  Negative for chest pain, palpitations and leg swelling.   Gastrointestinal:  Negative for abdominal pain, constipation, diarrhea and nausea.   Endocrine: Negative for cold intolerance, heat intolerance, polydipsia and polyuria.   Genitourinary:  Negative for dysuria, frequency and urgency.   Musculoskeletal:  Negative for arthralgias, joint swelling and myalgias.   Skin:  Negative for  "rash.        No suspicious lesions   Neurological:  Negative for weakness, numbness and headaches.   Hematological:  Negative for adenopathy.       Current Outpatient Medications on File Prior to Visit   Medication Sig    Calcium Carbonate-Vit D-Min (CALCIUM 1200 PO) Take by mouth    cyanocobalamin (VITAMIN B-12) 1000 MCG tablet Take 1,000 mcg by mouth daily today    dicyclomine (BENTYL) 20 mg tablet Take 1 tablet (20 mg total) by mouth 4 (four) times a day (before meals and at bedtime)    ferrous sulfate 325 (65 Fe) mg tablet Take 325 mg by mouth 2 (two) times a day today    multivitamin (THERAGRAN) TABS Take 1 tablet by mouth daily today    pantoprazole (PROTONIX) 20 mg tablet Take 1 tablet (20 mg total) by mouth daily with breakfast    Thiamine HCl (B-1 PO) Take 1 capsule by mouth in the morning    Probiotic Product (PROBIOTIC-10 PO) Take by mouth (Patient not taking: Reported on 2/27/2024)       Objective     /74   Pulse 86   Temp (!) 97 °F (36.1 °C) (Temporal)   Resp 16   Ht 5' 9\" (1.753 m)   Wt 130 kg (287 lb 3.2 oz)   SpO2 99%   BMI 42.41 kg/m²     Physical Exam  Constitutional:       General: He is not in acute distress.     Appearance: Normal appearance. He is well-developed.   HENT:      Head: Normocephalic and atraumatic.      Right Ear: Tympanic membrane, ear canal and external ear normal.      Left Ear: Tympanic membrane, ear canal and external ear normal.      Nose: Nose normal.      Mouth/Throat:      Mouth: Mucous membranes are moist.      Pharynx: Oropharynx is clear. No posterior oropharyngeal erythema.   Eyes:      Extraocular Movements: Extraocular movements intact.      Conjunctiva/sclera: Conjunctivae normal.      Pupils: Pupils are equal, round, and reactive to light.   Neck:      Thyroid: No thyromegaly.      Vascular: No carotid bruit or JVD.   Cardiovascular:      Rate and Rhythm: Normal rate and regular rhythm.      Heart sounds: S1 normal and S2 normal. No murmur heard.     " No friction rub. No gallop. No S3 or S4 sounds.   Pulmonary:      Effort: Pulmonary effort is normal.      Breath sounds: Normal breath sounds. No wheezing, rhonchi or rales.   Abdominal:      General: Bowel sounds are normal. There is no distension.      Palpations: Abdomen is soft.      Tenderness: There is no abdominal tenderness.   Musculoskeletal:      Cervical back: Neck supple.   Lymphadenopathy:      Cervical: No cervical adenopathy.   Skin:     Comments: R buttocks with firm, mildly erythematous area 2cm x 2cm    Neurological:      General: No focal deficit present.      Mental Status: He is alert and oriented to person, place, and time.      Cranial Nerves: No cranial nerve deficit.      Sensory: No sensory deficit.      Deep Tendon Reflexes: Reflexes are normal and symmetric. Reflexes normal.       Elida Encinas, DO

## 2024-02-27 NOTE — PATIENT INSTRUCTIONS
Obtain covid shot  Keflex three times daily x 7 days for cyst.  Warm compresses.  If redness expands or it's increasingly painful or you start with fevers, let me know  Refer to surgery

## 2024-03-04 ENCOUNTER — PATIENT MESSAGE (OUTPATIENT)
Dept: FAMILY MEDICINE CLINIC | Facility: CLINIC | Age: 39
End: 2024-03-04

## 2024-03-13 ENCOUNTER — CONSULT (OUTPATIENT)
Dept: SURGERY | Facility: CLINIC | Age: 39
End: 2024-03-13
Payer: COMMERCIAL

## 2024-03-13 VITALS — BODY MASS INDEX: 45.8 KG/M2 | WEIGHT: 285 LBS | HEIGHT: 66 IN

## 2024-03-13 DIAGNOSIS — L72.3 SEBACEOUS CYST: ICD-10-CM

## 2024-03-13 DIAGNOSIS — L72.9 SCALP CYST: Primary | ICD-10-CM

## 2024-03-13 PROCEDURE — 99203 OFFICE O/P NEW LOW 30 MIN: CPT | Performed by: SURGERY

## 2024-03-13 NOTE — PROGRESS NOTES
Assessment/Plan:    Diagnoses and all orders for this visit:    Scalp cyst    This is been quite stable for several years.  No signs of infection.  At this point he will observe.  If he notices any particular issues he will return to discuss removal.      Sebaceous cyst    Suspect this was an abscess from folliculitis.  Never had any type of cyst here before.  Much improved after being on the antibiotics.  No role for removal at this point.  If it should return I asked him to give us a call.    Subjective:      Patient ID: Mau Webster is a 38 y.o. male.    Patient presents for evaluation of a cyst on his right buttock.  States he noticed a lump on his right buttock one month ago.  Lump was red and inflamed.  He took a course of Keflex with improvement.  He never noted any type of a lump or cystic area of his right buttock prior to this inflammatory episode.  Has seen dramatic improvement on the Keflex.      He also has a scalp cyst.  He has had the scalp cyst for many years.  Denies pain or size change.  Notices it when he cuts his own hair.  Denies ever having trouble with inflammation         The following portions of the patient's history were reviewed and updated as appropriate:     He  has a past medical history of Anemia, Anxiety, Depression, GERD (gastroesophageal reflux disease), GI (gastrointestinal bleed) (2019), Morbid obesity (HCC), Obesity, and Peptic ulcer.  He  has a past surgical history that includes Bisi-en-y procedure; Gastric bypass; TONSILECTOMY AND ADNOIDECTOMY; Colonoscopy; Upper gastrointestinal endoscopy; and Bariatric Surgery.  His family history includes Cancer in his paternal aunt and paternal aunt; Depression in his mother; Diabetes in his father, maternal grandfather, and maternal grandmother; Throat cancer in his paternal aunt; Uterine cancer in his maternal aunt.  He  reports that he has never smoked. He has never used smokeless tobacco. He reports current alcohol use of about  "12.0 standard drinks of alcohol per week. He reports that he does not use drugs.  Current Outpatient Medications   Medication Sig Dispense Refill    Calcium Carbonate-Vit D-Min (CALCIUM 1200 PO) Take by mouth      cyanocobalamin (VITAMIN B-12) 1000 MCG tablet Take 1,000 mcg by mouth daily today      dicyclomine (BENTYL) 20 mg tablet Take 1 tablet (20 mg total) by mouth 4 (four) times a day (before meals and at bedtime) 120 tablet 5    ferrous sulfate 325 (65 Fe) mg tablet Take 325 mg by mouth 2 (two) times a day today      multivitamin (THERAGRAN) TABS Take 1 tablet by mouth daily today      pantoprazole (PROTONIX) 20 mg tablet Take 1 tablet (20 mg total) by mouth daily with breakfast 90 tablet 3    Probiotic Product (PROBIOTIC-10 PO) Take by mouth (Patient not taking: Reported on 2/27/2024)      Thiamine HCl (B-1 PO) Take 1 capsule by mouth in the morning       No current facility-administered medications for this visit.     He has No Known Allergies..    Review of Systems   All other systems reviewed and are negative.        Objective:      Ht 5' 6\" (1.676 m)   Wt 129 kg (285 lb)   BMI 46.00 kg/m²          Physical Exam  Skin:     General: Skin is warm and dry.      Comments: 1.5 to 2 cm scalp lesion.  Likely a cyst versus a lipoma.  No signs of infection.    Central area of the right buttock reveals resolving inflammation.  No palpable lump or mass.  There is some residual induration which will likely take a few weeks to settle down given his recent infection.         "

## 2024-08-14 DIAGNOSIS — R10.10 PAIN OF UPPER ABDOMEN: ICD-10-CM

## 2024-08-14 DIAGNOSIS — R10.13 DYSPEPSIA: ICD-10-CM

## 2024-08-14 DIAGNOSIS — Z98.84 S/P GASTRIC BYPASS: ICD-10-CM

## 2024-08-14 RX ORDER — DICYCLOMINE HCL 20 MG
20 TABLET ORAL
Qty: 120 TABLET | Refills: 5 | Status: SHIPPED | OUTPATIENT
Start: 2024-08-14

## 2024-08-14 RX ORDER — PANTOPRAZOLE SODIUM 20 MG/1
20 TABLET, DELAYED RELEASE ORAL
Qty: 90 TABLET | Refills: 1 | Status: SHIPPED | OUTPATIENT
Start: 2024-08-14

## 2024-08-14 NOTE — TELEPHONE ENCOUNTER
Pt has upcoming appt on 8/26 and wonders if he can't get a full supply could he at least get a temporary supply to last until he appt.    Reason for call:   [x] Refill   [] Prior Auth  [] Other:     Office:   [] PCP/Provider -   [x] Specialty/Provider - [facility] [place] / Adilene Hand MD     Medication:   dicyclomine (BENTYL) 20 mg tablet - Take 1 tablet (20 mg total) by mouth 4 (four) times a day (before meals and at bedtime)   pantoprazole (PROTONIX) 20 mg tablet - Take 1 tablet (20 mg total) by mouth daily with breakfast     Pharmacy:   [facility] - [place], [place] - [address]     Does the patient have enough for 3 days?   [] Yes   [x] No - Send as HP to POD

## 2024-08-26 ENCOUNTER — OFFICE VISIT (OUTPATIENT)
Dept: GASTROENTEROLOGY | Facility: AMBULARY SURGERY CENTER | Age: 39
End: 2024-08-26
Payer: COMMERCIAL

## 2024-08-26 VITALS
HEART RATE: 88 BPM | HEIGHT: 66 IN | BODY MASS INDEX: 29.51 KG/M2 | OXYGEN SATURATION: 97 % | WEIGHT: 183.6 LBS | SYSTOLIC BLOOD PRESSURE: 126 MMHG | DIASTOLIC BLOOD PRESSURE: 72 MMHG

## 2024-08-26 DIAGNOSIS — R10.10 PAIN OF UPPER ABDOMEN: Primary | ICD-10-CM

## 2024-08-26 DIAGNOSIS — Z98.84 HISTORY OF ROUX-EN-Y GASTRIC BYPASS: ICD-10-CM

## 2024-08-26 PROBLEM — E66.01 SEVERE OBESITY (BMI >= 40) (HCC): Status: RESOLVED | Noted: 2022-02-04 | Resolved: 2024-08-26

## 2024-08-26 PROCEDURE — 99204 OFFICE O/P NEW MOD 45 MIN: CPT | Performed by: INTERNAL MEDICINE

## 2024-08-26 RX ORDER — ACETAMINOPHEN 160 MG
2000 TABLET,DISINTEGRATING ORAL DAILY
COMMUNITY
Start: 2024-02-22

## 2024-08-26 RX ORDER — DICYCLOMINE HCL 20 MG
20 TABLET ORAL
Qty: 120 TABLET | Refills: 5 | Status: SHIPPED | OUTPATIENT
Start: 2024-08-26

## 2024-08-26 NOTE — PATIENT INSTRUCTIONS
- try to increase bentyl/dicyclomine to add dose after dinner or before bedtime based on timing of symptoms    - can try to cut down on protonix  - take 1 pill every other day x 1 week  - take 1 pill every 3 days x 1 week  - then stop  - if symptoms worsen as stepping down, go back up to last effective dose

## 2024-08-26 NOTE — ASSESSMENT & PLAN NOTE
Chronic symptoms > 12 months, clinically stable  Evaluation includes EGD June 2023, RUQ ultrasound March 2023  Predominance of symptoms of postprandial dyspepsia and pain of the upper abdomen, associated with belching and flatus- worse after evening meal, intermittent  Therapeutic failure of omeprazole, Carafate, esomeprazole, lansoprazole  S/p RYGB, emphasized lifestyle measures  Partial improvement with BID dosing of bentyl, suggest additional dosing after dinner or QHS- patient unable to take medication during the day due to schedule  Alternative options to include TCA or rifaximin if therapeutic failure

## 2024-08-26 NOTE — PROGRESS NOTES
Ambulatory Visit  Name: Mau Webster      : 1985      MRN: [id number]  Encounter Provider: Adilene Hand MD  Encounter Date: 2024   Encounter department: [facility] [place]    Assessment & Plan   1. Pain of upper abdomen  Assessment & Plan:  Chronic symptoms > 12 months, clinically stable  Evaluation includes EGD 2023, RUQ ultrasound 2023  Predominance of symptoms of postprandial dyspepsia and pain of the upper abdomen, associated with belching and flatus- worse after evening meal, intermittent  Therapeutic failure of omeprazole, Carafate, esomeprazole, lansoprazole  S/p RYGB, emphasized lifestyle measures  Partial improvement with BID dosing of bentyl, suggest additional dosing after dinner or QHS- patient unable to take medication during the day due to schedule  Alternative options to include TCA or rifaximin if therapeutic failure  Orders:  -     dicyclomine (BENTYL) 20 mg tablet; Take 1 tablet (20 mg total) by mouth 4 (four) times a day (before meals and at bedtime)  2. History of Bisi-en-Y gastric bypass  Assessment & Plan:  Chronic, clinically stable  Complicated by loose stools in the morning ?dumping syndrome  Continue small frequent meals      History of Present Illness     Mau Webster is a 38 y.o. male who presents in follow-up for dyspepsia/upper abdominal pain.  He is improved with taking Bentyl twice a day, he takes this approximately every 12 hours, in the morning before breakfast, and in the evening after returning home from work.  He has not tried to increase his dose at this time.  He does note partial improvement, but does continue to have symptoms after his evening meals.  His symptoms are intermittent.  They are less frequent during the day but do still occur at times    He continues Protonix daily 20 mg  Therapeutic failure of omeprazole, esomeprazole, lansoprazole, Carafate    EGD 2023-s/p RYGB, gastritis  RUQ ultrasound  "March 2023- unremarkable  Colonoscopy 2021-    Abdominal Pain  This is a recurrent problem. The current episode started more than 1 year ago. The onset quality is sudden. The problem occurs every several days. The most recent episode lasted 1 hours. The problem has been unchanged. The pain is located in the periumbilical region. The pain is at a severity of 5/10. The quality of the pain is cramping. The abdominal pain does not radiate. Associated symptoms include arthralgias, belching, diarrhea, flatus and myalgias. Pertinent negatives include no anorexia, constipation, dysuria, fever, frequency, headaches, hematochezia, hematuria, melena, nausea, vomiting or weight loss. The pain is aggravated by eating. The pain is relieved by Belching, passing flatus and vomiting. Prior diagnostic workup includes lower endoscopy, ultrasound and upper endoscopy.         Review of Systems   Constitutional:  Negative for chills, fever and weight loss.   HENT:  Negative for ear pain and sore throat.    Eyes:  Negative for pain and visual disturbance.   Respiratory:  Negative for cough and shortness of breath.    Cardiovascular:  Negative for chest pain and palpitations.   Gastrointestinal:  Positive for abdominal pain, diarrhea and flatus. Negative for anorexia, constipation, hematochezia, melena, nausea and vomiting.   Genitourinary:  Negative for dysuria, frequency and hematuria.   Musculoskeletal:  Positive for arthralgias and myalgias. Negative for back pain.   Skin:  Negative for color change and rash.   Neurological:  Negative for seizures, syncope and headaches.   All other systems reviewed and are negative.      Objective     /72 (BP Location: Left arm, Patient Position: Sitting, Cuff Size: Large)   Pulse 88   Ht 5' 6\" (1.676 m)   Wt 83.3 kg (183 lb 9.6 oz)   SpO2 97%   BMI 29.63 kg/m²     Physical Exam  Vitals and nursing note reviewed.   Constitutional:       General: He is not in acute distress.     Appearance: " He is well-developed.   HENT:      Head: Normocephalic and atraumatic.   Eyes:      Conjunctiva/sclera: Conjunctivae normal.   Cardiovascular:      Rate and Rhythm: Normal rate and regular rhythm.      Heart sounds: No murmur heard.  Pulmonary:      Effort: Pulmonary effort is normal. No respiratory distress.      Breath sounds: Normal breath sounds.   Abdominal:      Palpations: Abdomen is soft.      Tenderness: There is no abdominal tenderness.   Musculoskeletal:         General: No swelling.      Cervical back: Neck supple.   Skin:     General: Skin is warm and dry.      Capillary Refill: Capillary refill takes less than 2 seconds.   Neurological:      Mental Status: He is alert.   Psychiatric:         Mood and Affect: Mood normal.       Administrative Statements   I have spent a total time of 30 minutes in caring for this patient on the day of the visit/encounter including Diagnostic results, Prognosis, Risks and benefits of tx options, Instructions for management, Patient and family education, Importance of tx compliance, Risk factor reductions, Impressions, Documenting in the medical record, Reviewing / ordering tests, medicine, procedures  , and Obtaining or reviewing history  .

## 2024-11-24 NOTE — TELEPHONE ENCOUNTER
Patient is aware of medication directions, he is stating that he needs additional medication - he is having a nervous breakdown; he is experiencing palpitations, inability to functions, I having extreme anxiety, hyperventilation, sweating, has had an appetite in the last few days, difficulty sleeping has only slept 2-3 hours, is currently stress du to job  Per mallory pg 38, advised patient to go to ER, offered to call an ambulance or a family member to take patient to ER  Patient refused, he stated he needed to make some calls but that he would find someone to take him to ER today  DISPLAY PLAN FREE TEXT

## 2025-04-21 ENCOUNTER — OFFICE VISIT (OUTPATIENT)
Dept: GASTROENTEROLOGY | Facility: AMBULARY SURGERY CENTER | Age: 40
End: 2025-04-21
Payer: COMMERCIAL

## 2025-04-21 VITALS
HEART RATE: 74 BPM | HEIGHT: 66 IN | SYSTOLIC BLOOD PRESSURE: 140 MMHG | OXYGEN SATURATION: 98 % | DIASTOLIC BLOOD PRESSURE: 94 MMHG | WEIGHT: 287.4 LBS | BODY MASS INDEX: 46.19 KG/M2

## 2025-04-21 DIAGNOSIS — R10.10 PAIN OF UPPER ABDOMEN: Primary | ICD-10-CM

## 2025-04-21 DIAGNOSIS — Z98.84 HISTORY OF ROUX-EN-Y GASTRIC BYPASS: ICD-10-CM

## 2025-04-21 PROCEDURE — 99203 OFFICE O/P NEW LOW 30 MIN: CPT | Performed by: INTERNAL MEDICINE

## 2025-04-21 RX ORDER — DEXLANSOPRAZOLE 60 MG/1
60 CAPSULE, DELAYED RELEASE ORAL DAILY
Qty: 90 CAPSULE | Refills: 3 | Status: SHIPPED | OUTPATIENT
Start: 2025-04-21 | End: 2025-04-22

## 2025-04-21 NOTE — ASSESSMENT & PLAN NOTE
Orders:    Small intestinal bacterial overgrowth    RABEprazole (ACIPHEX) 20 MG tablet; Take 1 tablet (20 mg total) by mouth daily

## 2025-04-21 NOTE — PROGRESS NOTES
Name: Mau Webster      : 1985      MRN: [id number]  Encounter Provider: Adilene Hand MD  Encounter Date: 2025   Encounter department: [facility]  :  Assessment & Plan  Pain of upper abdomen  Chronic symptoms > 12 months, clinically stable  Evaluation includes EGD 2023, RUQ ultrasound 2023  Predominance of symptoms of postprandial dyspepsia and pain of the upper abdomen, associated with belching and flatus- worse after evening meal, intermittent  Therapeutic failure of omeprazole, Carafate, esomeprazole, lansoprazole  S/p RYGB, emphasized lifestyle measures  Therapeutic failure of bentyl/antispasmodic  Will obtain SIBO testing  Trial alternative PPI-   Dexilant is cost prohibitive  Orders:    Small intestinal bacterial overgrowth    RABEprazole (ACIPHEX) 20 MG tablet; Take 1 tablet (20 mg total) by mouth daily    History of Bisi-en-Y gastric bypass    Orders:    Small intestinal bacterial overgrowth    RABEprazole (ACIPHEX) 20 MG tablet; Take 1 tablet (20 mg total) by mouth daily    BMI 45.0-49.9, adult ([facility])  S/p RYGB           History of Present Illness   Mau Webster is a 39 y.o. male who presents in office visit follow-up.  He reports unchanged symptoms, including symptoms of upper abdominal pain, and bleching after oral intake.  He has optimize his dose of Bentyl, without any change.  He has changed his Protonix to taking 20 mg, and then subsequently 40 mg without benefit.  History of Present Illness      Review of Systems A complete review of systems is negative other than that noted above in the HPI.      Current Outpatient Medications   Medication Sig Dispense Refill    Calcium Carbonate-Vit D-Min (CALCIUM 1200 PO) Take by mouth      Cholecalciferol (Vitamin D3) 50 MCG (2000 UT) capsule Take 2,000 Units by mouth daily      cyanocobalamin (VITAMIN B-12) 1000 MCG tablet Take 1,000 mcg by mouth daily today      dicyclomine (BENTYL) 20  "mg tablet Take 1 tablet (20 mg total) by mouth 4 (four) times a day (before meals and at bedtime) 120 tablet 5    ferrous sulfate 325 (65 Fe) mg tablet Take 325 mg by mouth 2 (two) times a day today      multivitamin (THERAGRAN) TABS Take 1 tablet by mouth daily today      RABEprazole (ACIPHEX) 20 MG tablet Take 1 tablet (20 mg total) by mouth daily 90 tablet 3    Thiamine HCl (B-1 PO) Take 1 capsule by mouth in the morning       No current facility-administered medications for this visit.     Objective   /94 (BP Location: Left arm, Patient Position: Sitting, Cuff Size: Standard)   Pulse 74   Ht 5' 6\" (1.676 m)   Wt 130 kg (287 lb 6.4 oz)   SpO2 98%   BMI 46.39 kg/m²     Physical Exam  Constitutional:       General: He is not in acute distress.     Appearance: Normal appearance.   HENT:      Head: Normocephalic and atraumatic.   Eyes:      General: No scleral icterus.  Pulmonary:      Effort: Pulmonary effort is normal. No respiratory distress.   Abdominal:      General: Bowel sounds are normal. There is no distension.      Palpations: Abdomen is soft.   Skin:     Coloration: Skin is not jaundiced.   Neurological:      Mental Status: He is alert and oriented to person, place, and time.   Psychiatric:         Behavior: Behavior normal.        Physical Exam      Results    Lab Results: I personally reviewed relevant lab results.       Results for orders placed during the hospital encounter of 06/29/23    EGD    Impression  Performed forceps biopsies in the middle third of the esophagus to rule out eosinophilic esophagitis  Mild erythematous mucosa in the antrum; performed cold forceps biopsy  Previous gastric bypass surgery in the stomach  The proximal jejunum appeared normal. Performed random biopsy to rule out celiac disease.      RECOMMENDATION:  Await pathology results    Continue omeprazole  Resume regular diet  Discharge home        Adilene Hand MD        "

## 2025-04-21 NOTE — ASSESSMENT & PLAN NOTE
Chronic symptoms > 12 months, clinically stable  Evaluation includes EGD June 2023, RUQ ultrasound March 2023  Predominance of symptoms of postprandial dyspepsia and pain of the upper abdomen, associated with belching and flatus- worse after evening meal, intermittent  Therapeutic failure of omeprazole, Carafate, esomeprazole, lansoprazole  S/p RYGB, emphasized lifestyle measures  Therapeutic failure of bentyl/antispasmodic  Will obtain SIBO testing  Trial alternative PPI-   Dexilant is cost prohibitive  Orders:    Small intestinal bacterial overgrowth    RABEprazole (ACIPHEX) 20 MG tablet; Take 1 tablet (20 mg total) by mouth daily

## 2025-04-22 RX ORDER — RABEPRAZOLE SODIUM 20 MG/1
20 TABLET, DELAYED RELEASE ORAL DAILY
Qty: 90 TABLET | Refills: 3 | Status: SHIPPED | OUTPATIENT
Start: 2025-04-22